# Patient Record
Sex: FEMALE | Race: WHITE | NOT HISPANIC OR LATINO | Employment: FULL TIME | URBAN - METROPOLITAN AREA
[De-identification: names, ages, dates, MRNs, and addresses within clinical notes are randomized per-mention and may not be internally consistent; named-entity substitution may affect disease eponyms.]

---

## 2018-06-30 ENCOUNTER — HOSPITAL ENCOUNTER (INPATIENT)
Facility: HOSPITAL | Age: 43
LOS: 4 days | Discharge: HOME | End: 2018-07-04
Attending: EMERGENCY MEDICINE | Admitting: HOSPITALIST

## 2018-06-30 ENCOUNTER — APPOINTMENT (OUTPATIENT)
Dept: CT IMAGING | Facility: HOSPITAL | Age: 43
End: 2018-06-30

## 2018-06-30 DIAGNOSIS — K57.32 SIGMOID DIVERTICULITIS: Primary | ICD-10-CM

## 2018-06-30 DIAGNOSIS — R10.30 LOWER ABDOMINAL PAIN: ICD-10-CM

## 2018-06-30 DIAGNOSIS — K57.20 DIVERTICULITIS OF LARGE INTESTINE WITH PERFORATION WITHOUT BLEEDING: ICD-10-CM

## 2018-06-30 PROBLEM — K63.1 PERFORATED SIGMOID COLON (HCC): Status: RESOLVED | Noted: 2018-06-30 | Resolved: 2018-06-30

## 2018-06-30 PROBLEM — K63.1 PERFORATED SIGMOID COLON (HCC): Status: ACTIVE | Noted: 2018-06-30

## 2018-06-30 PROBLEM — D72.825 BANDEMIA: Status: ACTIVE | Noted: 2018-06-30

## 2018-06-30 LAB
ALBUMIN SERPL-MCNC: 4.55 G/DL (ref 3.2–4.8)
ALBUMIN/GLOB SERPL: 1.1 G/DL (ref 1.5–2.5)
ALP SERPL-CCNC: 106 U/L (ref 25–100)
ALT SERPL W P-5'-P-CCNC: 42 U/L (ref 7–40)
ANION GAP SERPL CALCULATED.3IONS-SCNC: 10 MMOL/L (ref 3–11)
AST SERPL-CCNC: 25 U/L (ref 0–33)
BACTERIA UR QL AUTO: NORMAL /HPF
BASOPHILS # BLD AUTO: 0.06 10*3/MM3 (ref 0–0.2)
BASOPHILS NFR BLD AUTO: 0.4 % (ref 0–1)
BILIRUB SERPL-MCNC: 0.7 MG/DL (ref 0.3–1.2)
BILIRUB UR QL STRIP: NEGATIVE
BUN BLD-MCNC: 9 MG/DL (ref 9–23)
BUN/CREAT SERPL: 11.4 (ref 7–25)
CALCIUM SPEC-SCNC: 9.6 MG/DL (ref 8.7–10.4)
CHLORIDE SERPL-SCNC: 103 MMOL/L (ref 99–109)
CLARITY UR: CLEAR
CO2 SERPL-SCNC: 25 MMOL/L (ref 20–31)
COLOR UR: YELLOW
CREAT BLD-MCNC: 0.79 MG/DL (ref 0.6–1.3)
DEPRECATED RDW RBC AUTO: 42.7 FL (ref 37–54)
EOSINOPHIL # BLD AUTO: 0.17 10*3/MM3 (ref 0–0.3)
EOSINOPHIL NFR BLD AUTO: 1.1 % (ref 0–3)
ERYTHROCYTE [DISTWIDTH] IN BLOOD BY AUTOMATED COUNT: 12.7 % (ref 11.3–14.5)
GFR SERPL CREATININE-BSD FRML MDRD: 107 ML/MIN/1.73
GLOBULIN UR ELPH-MCNC: 4.3 GM/DL
GLUCOSE BLD-MCNC: 110 MG/DL (ref 70–100)
GLUCOSE UR STRIP-MCNC: NEGATIVE MG/DL
HCT VFR BLD AUTO: 46.8 % (ref 38.9–50.9)
HGB BLD-MCNC: 15.9 G/DL (ref 13.1–17.5)
HGB UR QL STRIP.AUTO: ABNORMAL
HOLD SPECIMEN: NORMAL
HOLD SPECIMEN: NORMAL
HYALINE CASTS UR QL AUTO: NORMAL /LPF
IMM GRANULOCYTES # BLD: 0.04 10*3/MM3 (ref 0–0.03)
IMM GRANULOCYTES NFR BLD: 0.3 % (ref 0–0.6)
KETONES UR QL STRIP: ABNORMAL
LEUKOCYTE ESTERASE UR QL STRIP.AUTO: NEGATIVE
LIPASE SERPL-CCNC: 28 U/L (ref 6–51)
LYMPHOCYTES # BLD AUTO: 3.07 10*3/MM3 (ref 0.6–4.8)
LYMPHOCYTES NFR BLD AUTO: 19.7 % (ref 24–44)
MCH RBC QN AUTO: 31.2 PG (ref 27–31)
MCHC RBC AUTO-ENTMCNC: 34 G/DL (ref 32–36)
MCV RBC AUTO: 91.9 FL (ref 80–99)
MONOCYTES # BLD AUTO: 1.69 10*3/MM3 (ref 0–1)
MONOCYTES NFR BLD AUTO: 10.9 % (ref 0–12)
NEUTROPHILS # BLD AUTO: 10.58 10*3/MM3 (ref 1.5–8.3)
NEUTROPHILS NFR BLD AUTO: 67.9 % (ref 41–71)
NITRITE UR QL STRIP: NEGATIVE
PH UR STRIP.AUTO: 5.5 [PH] (ref 5–8)
PLATELET # BLD AUTO: 253 10*3/MM3 (ref 150–450)
PMV BLD AUTO: 12.6 FL (ref 6–12)
POTASSIUM BLD-SCNC: 3.8 MMOL/L (ref 3.5–5.5)
PROT SERPL-MCNC: 8.8 G/DL (ref 5.7–8.2)
PROT UR QL STRIP: NEGATIVE
RBC # BLD AUTO: 5.09 10*6/MM3 (ref 4.2–5.76)
RBC # UR: NORMAL /HPF
REF LAB TEST METHOD: NORMAL
SODIUM BLD-SCNC: 138 MMOL/L (ref 132–146)
SP GR UR STRIP: 1.02 (ref 1–1.03)
SQUAMOUS #/AREA URNS HPF: NORMAL /HPF
UROBILINOGEN UR QL STRIP: ABNORMAL
WBC NRBC COR # BLD: 15.57 10*3/MM3 (ref 3.5–10.8)
WBC UR QL AUTO: NORMAL /HPF
WHOLE BLOOD HOLD SPECIMEN: NORMAL
WHOLE BLOOD HOLD SPECIMEN: NORMAL

## 2018-06-30 PROCEDURE — 87040 BLOOD CULTURE FOR BACTERIA: CPT | Performed by: FAMILY MEDICINE

## 2018-06-30 PROCEDURE — 25010000002 HYDROMORPHONE PER 4 MG: Performed by: EMERGENCY MEDICINE

## 2018-06-30 PROCEDURE — 80053 COMPREHEN METABOLIC PANEL: CPT | Performed by: PHYSICIAN ASSISTANT

## 2018-06-30 PROCEDURE — 25010000002 PIPERACILLIN SOD-TAZOBACTAM PER 1 G: Performed by: PHYSICIAN ASSISTANT

## 2018-06-30 PROCEDURE — 25010000002 HYDROMORPHONE PER 4 MG: Performed by: FAMILY MEDICINE

## 2018-06-30 PROCEDURE — 81001 URINALYSIS AUTO W/SCOPE: CPT | Performed by: PHYSICIAN ASSISTANT

## 2018-06-30 PROCEDURE — 85025 COMPLETE CBC W/AUTO DIFF WBC: CPT | Performed by: PHYSICIAN ASSISTANT

## 2018-06-30 PROCEDURE — 25010000002 IOPAMIDOL 61 % SOLUTION: Performed by: EMERGENCY MEDICINE

## 2018-06-30 PROCEDURE — 25010000002 KETOROLAC TROMETHAMINE PER 15 MG: Performed by: PHYSICIAN ASSISTANT

## 2018-06-30 PROCEDURE — 25010000002 PIPERACILLIN SOD-TAZOBACTAM PER 1 G: Performed by: FAMILY MEDICINE

## 2018-06-30 PROCEDURE — 0 DIATRIZOATE MEGLUMINE & SODIUM PER 1 ML: Performed by: PHYSICIAN ASSISTANT

## 2018-06-30 PROCEDURE — 74177 CT ABD & PELVIS W/CONTRAST: CPT

## 2018-06-30 PROCEDURE — 25010000002 ENOXAPARIN PER 10 MG: Performed by: FAMILY MEDICINE

## 2018-06-30 PROCEDURE — 99223 1ST HOSP IP/OBS HIGH 75: CPT | Performed by: FAMILY MEDICINE

## 2018-06-30 PROCEDURE — 25010000002 ONDANSETRON PER 1 MG: Performed by: PHYSICIAN ASSISTANT

## 2018-06-30 PROCEDURE — 99284 EMERGENCY DEPT VISIT MOD MDM: CPT

## 2018-06-30 PROCEDURE — 83690 ASSAY OF LIPASE: CPT | Performed by: PHYSICIAN ASSISTANT

## 2018-06-30 RX ORDER — SODIUM CHLORIDE 9 MG/ML
100 INJECTION, SOLUTION INTRAVENOUS CONTINUOUS
Status: DISCONTINUED | OUTPATIENT
Start: 2018-06-30 | End: 2018-07-01

## 2018-06-30 RX ORDER — NALOXONE HCL 0.4 MG/ML
0.4 VIAL (ML) INJECTION
Status: DISCONTINUED | OUTPATIENT
Start: 2018-06-30 | End: 2018-07-04 | Stop reason: HOSPADM

## 2018-06-30 RX ORDER — SODIUM CHLORIDE 0.9 % (FLUSH) 0.9 %
10 SYRINGE (ML) INJECTION AS NEEDED
Status: DISCONTINUED | OUTPATIENT
Start: 2018-06-30 | End: 2018-07-04 | Stop reason: HOSPADM

## 2018-06-30 RX ORDER — NICOTINE 21 MG/24HR
1 PATCH, TRANSDERMAL 24 HOURS TRANSDERMAL DAILY
Status: DISCONTINUED | OUTPATIENT
Start: 2018-06-30 | End: 2018-07-04 | Stop reason: HOSPADM

## 2018-06-30 RX ORDER — ONDANSETRON 2 MG/ML
4 INJECTION INTRAMUSCULAR; INTRAVENOUS ONCE
Status: COMPLETED | OUTPATIENT
Start: 2018-06-30 | End: 2018-06-30

## 2018-06-30 RX ORDER — PANTOPRAZOLE SODIUM 40 MG/10ML
40 INJECTION, POWDER, LYOPHILIZED, FOR SOLUTION INTRAVENOUS ONCE
Status: COMPLETED | OUTPATIENT
Start: 2018-06-30 | End: 2018-06-30

## 2018-06-30 RX ORDER — KETOROLAC TROMETHAMINE 15 MG/ML
15 INJECTION, SOLUTION INTRAMUSCULAR; INTRAVENOUS ONCE
Status: COMPLETED | OUTPATIENT
Start: 2018-06-30 | End: 2018-06-30

## 2018-06-30 RX ORDER — FUROSEMIDE 10 MG/ML
40 INJECTION INTRAMUSCULAR; INTRAVENOUS ONCE
Status: DISCONTINUED | OUTPATIENT
Start: 2018-06-30 | End: 2018-06-30

## 2018-06-30 RX ORDER — LORAZEPAM 1 MG/1
1 TABLET ORAL NIGHTLY PRN
Status: DISPENSED | OUTPATIENT
Start: 2018-06-30 | End: 2018-07-03

## 2018-06-30 RX ORDER — HYDROCODONE BITARTRATE AND ACETAMINOPHEN 5; 325 MG/1; MG/1
1 TABLET ORAL EVERY 4 HOURS PRN
Status: DISCONTINUED | OUTPATIENT
Start: 2018-06-30 | End: 2018-07-04 | Stop reason: HOSPADM

## 2018-06-30 RX ORDER — SODIUM CHLORIDE 0.9 % (FLUSH) 0.9 %
1-10 SYRINGE (ML) INJECTION AS NEEDED
Status: DISCONTINUED | OUTPATIENT
Start: 2018-06-30 | End: 2018-07-04 | Stop reason: HOSPADM

## 2018-06-30 RX ORDER — ONDANSETRON 2 MG/ML
4 INJECTION INTRAMUSCULAR; INTRAVENOUS EVERY 6 HOURS PRN
Status: DISCONTINUED | OUTPATIENT
Start: 2018-06-30 | End: 2018-07-04 | Stop reason: HOSPADM

## 2018-06-30 RX ADMIN — HYDROMORPHONE HYDROCHLORIDE 0.5 MG: 1 INJECTION, SOLUTION INTRAMUSCULAR; INTRAVENOUS; SUBCUTANEOUS at 15:20

## 2018-06-30 RX ADMIN — ENOXAPARIN SODIUM 40 MG: 40 INJECTION SUBCUTANEOUS at 16:57

## 2018-06-30 RX ADMIN — IOPAMIDOL 95 ML: 612 INJECTION, SOLUTION INTRAVENOUS at 14:26

## 2018-06-30 RX ADMIN — ONDANSETRON 4 MG: 2 INJECTION INTRAMUSCULAR; INTRAVENOUS at 12:57

## 2018-06-30 RX ADMIN — TAZOBACTAM SODIUM AND PIPERACILLIN SODIUM 4.5 G: 500; 4 INJECTION, SOLUTION INTRAVENOUS at 15:21

## 2018-06-30 RX ADMIN — SODIUM CHLORIDE 1000 ML: 9 INJECTION, SOLUTION INTRAVENOUS at 12:57

## 2018-06-30 RX ADMIN — TAZOBACTAM SODIUM AND PIPERACILLIN SODIUM 3.38 G: 375; 3 INJECTION, SOLUTION INTRAVENOUS at 22:08

## 2018-06-30 RX ADMIN — DIATRIZOATE MEGLUMINE AND DIATRIZOATE SODIUM 15 ML: 660; 100 LIQUID ORAL; RECTAL at 12:56

## 2018-06-30 RX ADMIN — PANTOPRAZOLE SODIUM 40 MG: 40 INJECTION, POWDER, FOR SOLUTION INTRAVENOUS at 12:57

## 2018-06-30 RX ADMIN — SODIUM CHLORIDE 100 ML/HR: 9 INJECTION, SOLUTION INTRAVENOUS at 16:56

## 2018-06-30 RX ADMIN — HYDROMORPHONE HYDROCHLORIDE 0.5 MG: 1 INJECTION, SOLUTION INTRAMUSCULAR; INTRAVENOUS; SUBCUTANEOUS at 23:58

## 2018-06-30 RX ADMIN — KETOROLAC TROMETHAMINE 15 MG: 15 INJECTION, SOLUTION INTRAMUSCULAR; INTRAVENOUS at 12:57

## 2018-06-30 RX ADMIN — HYDROCODONE BITARTRATE AND ACETAMINOPHEN 1 TABLET: 5; 325 TABLET ORAL at 22:06

## 2018-06-30 RX ADMIN — HYDROMORPHONE HYDROCHLORIDE 0.5 MG: 1 INJECTION, SOLUTION INTRAMUSCULAR; INTRAVENOUS; SUBCUTANEOUS at 16:56

## 2018-06-30 RX ADMIN — HYDROMORPHONE HYDROCHLORIDE 0.5 MG: 1 INJECTION, SOLUTION INTRAMUSCULAR; INTRAVENOUS; SUBCUTANEOUS at 20:19

## 2018-07-01 LAB
ANION GAP SERPL CALCULATED.3IONS-SCNC: 8 MMOL/L (ref 3–11)
ARTICHOKE IGE QN: 105 MG/DL (ref 0–130)
BASOPHILS # BLD AUTO: 0.05 10*3/MM3 (ref 0–0.2)
BASOPHILS NFR BLD AUTO: 0.4 % (ref 0–1)
BUN BLD-MCNC: 7 MG/DL (ref 9–23)
BUN/CREAT SERPL: 9.5 (ref 7–25)
CALCIUM SPEC-SCNC: 8.3 MG/DL (ref 8.7–10.4)
CHLORIDE SERPL-SCNC: 105 MMOL/L (ref 99–109)
CHOLEST SERPL-MCNC: 133 MG/DL (ref 0–200)
CO2 SERPL-SCNC: 25 MMOL/L (ref 20–31)
CREAT BLD-MCNC: 0.74 MG/DL (ref 0.6–1.3)
DEPRECATED RDW RBC AUTO: 43.9 FL (ref 37–54)
EOSINOPHIL # BLD AUTO: 0.15 10*3/MM3 (ref 0–0.3)
EOSINOPHIL NFR BLD AUTO: 1.1 % (ref 0–3)
ERYTHROCYTE [DISTWIDTH] IN BLOOD BY AUTOMATED COUNT: 12.7 % (ref 11.3–14.5)
GFR SERPL CREATININE-BSD FRML MDRD: 115 ML/MIN/1.73
GLUCOSE BLD-MCNC: 88 MG/DL (ref 70–100)
HBA1C MFR BLD: 6.2 % (ref 4.8–5.6)
HCT VFR BLD AUTO: 42.2 % (ref 38.9–50.9)
HDLC SERPL-MCNC: 26 MG/DL (ref 40–60)
HGB BLD-MCNC: 13.9 G/DL (ref 13.1–17.5)
IMM GRANULOCYTES # BLD: 0.04 10*3/MM3 (ref 0–0.03)
IMM GRANULOCYTES NFR BLD: 0.3 % (ref 0–0.6)
LYMPHOCYTES # BLD AUTO: 2.54 10*3/MM3 (ref 0.6–4.8)
LYMPHOCYTES NFR BLD AUTO: 19.3 % (ref 24–44)
MCH RBC QN AUTO: 30.9 PG (ref 27–31)
MCHC RBC AUTO-ENTMCNC: 32.9 G/DL (ref 32–36)
MCV RBC AUTO: 93.8 FL (ref 80–99)
MONOCYTES # BLD AUTO: 1.28 10*3/MM3 (ref 0–1)
MONOCYTES NFR BLD AUTO: 9.7 % (ref 0–12)
NEUTROPHILS # BLD AUTO: 9.12 10*3/MM3 (ref 1.5–8.3)
NEUTROPHILS NFR BLD AUTO: 69.2 % (ref 41–71)
PLATELET # BLD AUTO: 191 10*3/MM3 (ref 150–450)
PMV BLD AUTO: 12.2 FL (ref 6–12)
POTASSIUM BLD-SCNC: 3.7 MMOL/L (ref 3.5–5.5)
RBC # BLD AUTO: 4.5 10*6/MM3 (ref 4.2–5.76)
SODIUM BLD-SCNC: 138 MMOL/L (ref 132–146)
TRIGL SERPL-MCNC: 90 MG/DL (ref 0–150)
WBC NRBC COR # BLD: 13.18 10*3/MM3 (ref 3.5–10.8)

## 2018-07-01 PROCEDURE — 85025 COMPLETE CBC W/AUTO DIFF WBC: CPT | Performed by: FAMILY MEDICINE

## 2018-07-01 PROCEDURE — 25010000002 HYDROMORPHONE PER 4 MG: Performed by: FAMILY MEDICINE

## 2018-07-01 PROCEDURE — 83036 HEMOGLOBIN GLYCOSYLATED A1C: CPT | Performed by: FAMILY MEDICINE

## 2018-07-01 PROCEDURE — 80048 BASIC METABOLIC PNL TOTAL CA: CPT | Performed by: FAMILY MEDICINE

## 2018-07-01 PROCEDURE — 25010000002 ENOXAPARIN PER 10 MG: Performed by: FAMILY MEDICINE

## 2018-07-01 PROCEDURE — 80061 LIPID PANEL: CPT | Performed by: FAMILY MEDICINE

## 2018-07-01 PROCEDURE — 99232 SBSQ HOSP IP/OBS MODERATE 35: CPT | Performed by: INTERNAL MEDICINE

## 2018-07-01 PROCEDURE — 25010000002 PIPERACILLIN SOD-TAZOBACTAM PER 1 G: Performed by: FAMILY MEDICINE

## 2018-07-01 RX ORDER — ACETAMINOPHEN 325 MG/1
650 TABLET ORAL EVERY 6 HOURS PRN
Status: DISCONTINUED | OUTPATIENT
Start: 2018-07-01 | End: 2018-07-04 | Stop reason: HOSPADM

## 2018-07-01 RX ORDER — ACETAMINOPHEN 160 MG/5ML
650 SOLUTION ORAL EVERY 6 HOURS PRN
Status: DISCONTINUED | OUTPATIENT
Start: 2018-07-01 | End: 2018-07-01

## 2018-07-01 RX ORDER — DEXTROSE, SODIUM CHLORIDE, AND POTASSIUM CHLORIDE 5; .45; .15 G/100ML; G/100ML; G/100ML
100 INJECTION INTRAVENOUS CONTINUOUS
Status: DISCONTINUED | OUTPATIENT
Start: 2018-07-01 | End: 2018-07-04 | Stop reason: HOSPADM

## 2018-07-01 RX ADMIN — HYDROMORPHONE HYDROCHLORIDE 0.5 MG: 1 INJECTION, SOLUTION INTRAMUSCULAR; INTRAVENOUS; SUBCUTANEOUS at 23:44

## 2018-07-01 RX ADMIN — HYDROMORPHONE HYDROCHLORIDE 0.5 MG: 1 INJECTION, SOLUTION INTRAMUSCULAR; INTRAVENOUS; SUBCUTANEOUS at 12:57

## 2018-07-01 RX ADMIN — LORAZEPAM 1 MG: 1 TABLET ORAL at 23:43

## 2018-07-01 RX ADMIN — TAZOBACTAM SODIUM AND PIPERACILLIN SODIUM 3.38 G: 375; 3 INJECTION, SOLUTION INTRAVENOUS at 21:20

## 2018-07-01 RX ADMIN — POTASSIUM CHLORIDE, DEXTROSE MONOHYDRATE AND SODIUM CHLORIDE 100 ML/HR: 150; 5; 450 INJECTION, SOLUTION INTRAVENOUS at 17:52

## 2018-07-01 RX ADMIN — HYDROCODONE BITARTRATE AND ACETAMINOPHEN 1 TABLET: 5; 325 TABLET ORAL at 13:58

## 2018-07-01 RX ADMIN — SODIUM CHLORIDE 100 ML/HR: 9 INJECTION, SOLUTION INTRAVENOUS at 05:17

## 2018-07-01 RX ADMIN — HYDROMORPHONE HYDROCHLORIDE 0.5 MG: 1 INJECTION, SOLUTION INTRAMUSCULAR; INTRAVENOUS; SUBCUTANEOUS at 05:15

## 2018-07-01 RX ADMIN — SODIUM CHLORIDE 100 ML/HR: 9 INJECTION, SOLUTION INTRAVENOUS at 16:08

## 2018-07-01 RX ADMIN — ACETAMINOPHEN 650 MG: 325 TABLET, FILM COATED ORAL at 21:32

## 2018-07-01 RX ADMIN — TAZOBACTAM SODIUM AND PIPERACILLIN SODIUM 3.38 G: 375; 3 INJECTION, SOLUTION INTRAVENOUS at 13:58

## 2018-07-01 RX ADMIN — TAZOBACTAM SODIUM AND PIPERACILLIN SODIUM 3.38 G: 375; 3 INJECTION, SOLUTION INTRAVENOUS at 06:09

## 2018-07-01 RX ADMIN — HYDROCODONE BITARTRATE AND ACETAMINOPHEN 1 TABLET: 5; 325 TABLET ORAL at 19:47

## 2018-07-01 RX ADMIN — HYDROMORPHONE HYDROCHLORIDE 0.5 MG: 1 INJECTION, SOLUTION INTRAMUSCULAR; INTRAVENOUS; SUBCUTANEOUS at 19:21

## 2018-07-01 RX ADMIN — HYDROMORPHONE HYDROCHLORIDE 0.5 MG: 1 INJECTION, SOLUTION INTRAMUSCULAR; INTRAVENOUS; SUBCUTANEOUS at 09:50

## 2018-07-01 RX ADMIN — HYDROCODONE BITARTRATE AND ACETAMINOPHEN 1 TABLET: 5; 325 TABLET ORAL at 02:10

## 2018-07-01 RX ADMIN — HYDROMORPHONE HYDROCHLORIDE 0.5 MG: 1 INJECTION, SOLUTION INTRAMUSCULAR; INTRAVENOUS; SUBCUTANEOUS at 17:11

## 2018-07-01 RX ADMIN — HYDROMORPHONE HYDROCHLORIDE 0.5 MG: 1 INJECTION, SOLUTION INTRAMUSCULAR; INTRAVENOUS; SUBCUTANEOUS at 21:35

## 2018-07-01 RX ADMIN — ENOXAPARIN SODIUM 40 MG: 40 INJECTION SUBCUTANEOUS at 17:11

## 2018-07-01 RX ADMIN — HYDROCODONE BITARTRATE AND ACETAMINOPHEN 1 TABLET: 5; 325 TABLET ORAL at 06:48

## 2018-07-01 NOTE — PLAN OF CARE
Problem: Patient Care Overview  Goal: Plan of Care Review  Outcome: Ongoing (interventions implemented as appropriate)   07/01/18 0210 07/01/18 0527   Plan of Care Review   Progress --  improving   Coping/Psychosocial   Plan of Care Reviewed With patient;spouse --      Goal: Individualization and Mutuality  Outcome: Ongoing (interventions implemented as appropriate)    Goal: Discharge Needs Assessment  Outcome: Ongoing (interventions implemented as appropriate)   06/30/18 1700   Disability   Equipment Currently Used at Home none   Discharge Needs Assessment   Patient/Family Anticipates Transition to home with family   Patient/Family Anticipated Services at Transition none   Transportation Concerns car, none   Transportation Anticipated car, drives self     Goal: Interprofessional Rounds/Family Conf  Outcome: Ongoing (interventions implemented as appropriate)      Problem: Pain, Acute (Adult)  Goal: Acceptable Pain Control/Comfort Level  Outcome: Ongoing (interventions implemented as appropriate)   06/30/18 1753   Pain, Acute (Adult)   Acceptable Pain Control/Comfort Level unable to achieve outcome

## 2018-07-01 NOTE — PROGRESS NOTES
[facility] Medicine Services  INPATIENT PROGRESS NOTE    Date of Admission: 6/30/2018  Length of Stay: 1  Primary Care Physician: No Known Provider    Subjective     Chief Complaint: abdominal pain    HPI:  Patient doing Okay, glad to be able to have some water  No bm, still in pain  Review Of Systems:   Patient denies headaches, fever, chills, shortness of breath, chest pain, cough, nausea or vomiting, diarrhea, rash, itching or bleeding    Objective      Vitals:   99.4 °F (37.4 °C) Temp  Min: 97.6 °F (36.4 °C)  Max: 99.9 °F (37.7 °C)    119/64 BP  Min: 109/77  Max: 132/88    90 Pulse  Min: 72  Max: 90    18 Resp  Min: 16  Max: 20    96 % SpO2  Min: 94 %  Max: 98 %    room air       No Data Recorded     Patient is alert and talkative in no distress at rest though certainly uncomfortable  Neck is without mass or JVD  Heart is Reg wo murmur  Lungs are clear wo wheeze or crackle  Abd is soft guards the lower part of his abdomen tender to palpation  MAEW  Skin is without rash  Neurologic exam is nonfocal   Mood is appropriate      Results Review:    I have reviewed the labs, radiology results and diagnostic studies.      Results from last 7 days  Lab Units 07/01/18  0709 06/30/18  1235   WBC 10*3/mm3 13.18* 15.57*   HEMOGLOBIN g/dL 13.9 15.9   HEMATOCRIT % 42.2 46.8   PLATELETS 10*3/mm3 191 253       Results from last 7 days  Lab Units 07/01/18  0709 06/30/18  1235   SODIUM mmol/L 138 138   POTASSIUM mmol/L 3.7 3.8   CHLORIDE mmol/L 105 103   CO2 mmol/L 25.0 25.0   BUN mg/dL 7* 9   CREATININE mg/dL 0.74 0.79   GLUCOSE mg/dL 88 110*   CALCIUM mg/dL 8.3* 9.6   ALT (SGPT) U/L  --  42*   AST (SGOT) U/L  --  25       Microbiology Results Abnormal     Procedure Component Value - Date/Time    Blood Culture - Blood, [[id number]]  (Normal) Collected:  06/30/18 8804    Lab Status:  Preliminary result Specimen:  Blood from Wrist, Right Updated:  07/01/18 0522     Blood Culture No growth at less than 24  hours    Blood Culture - Blood, [[id number]]  (Normal) Collected:  06/30/18 8811    Lab Status:  Preliminary result Specimen:  Blood from Wrist, Left Updated:  07/01/18 0545     Blood Culture No growth at less than 24 hours        Ct Abdomen Pelvis With Contrast    Result Date: 6/30/2018  1. Acute proximal sigmoid diverticulitis with adjacent extraluminal air and trace free fluid consistent with perforation and likely forming phlegmonous process however no mature abscess or drainable fluid collection is identified. 2. Right lateral abdominal wall hernia containing nondilated bowel and mesenteric fat. 3. Hepatic steatosis.  DICTATED:   6/30/2018 EDITED/ls :   6/30/2018   This report was finalized on 6/30/2018 3:58 PM by Dr. Danny Sharp.           I have reviewed the medications.    Assessment/Plan     Assessment/Problem List  Hospital Problem List     * (Principal)Perforation of sigmoid colon due to diverticulitis    Bandemia    Obesity    Tobacco dependence        Plan  43 yo with1 week of abdominal pain- admitted 6/30 from the ER with a perforated sigmoid diverticultus  Plan is to continued IV ABX, bowel rest-  If clinically improves would be discharged home- with outpatient colonoscopy and management by colorectal surgery    DVT prophylaxis: hep sq  Discharge Planning: I expect patient to be discharged to home when better  Electronically signed by Susan Moran MD, 07/01/18 12:55 PM .

## 2018-07-01 NOTE — CONSULTS
Patient Care Team:  No Known Provider as PCP - General    Chief complaint: Diverticulitis.    History of Present Illness: 42-year-old gentleman presents to the emergency room after 1 week of left lower quadrant abdominal pain.  Initially, patient had a mild discomfort and a sensation that he needed to go to the bathroom, but could not.  Over the course week this worsened to the point that he came to the emergency room.  Here his white count was 16,000 and CT scan revealed acute complicated diverticulitis with a phlegmon in the proximal sigmoid colon with adjacent free air and a trace amount of fluid.  Note is also made of a fairly large right lower abdominal hernia.    Patient has never had a colonoscopy.  He is a smoker.  3 years ago he had her freight appendicitis that was taken care of by Dr. Paul Gusman over at [facility].  He had a prolonged hospitalization and ultimately developed a hernia at his incision site about a year and a half ago.    I'm asked to see the patient to help in the management of diverticulitis.    Principal Problems:  Principal Problem:    Perforation of sigmoid colon due to diverticulitis  Active Problems:    Bandemia    Obesity    Tobacco dependence      Review of Systems   Pertinent items are noted in HPI    History  Past Medical History:   Diagnosis Date   • DJD (degenerative joint disease), lumbar    • Fatty liver    • Obesity    • Tobacco dependence    • Umbilical hernia      Past Surgical History:   Procedure Laterality Date   • APPENDECTOMY  2015     Family History   Problem Relation Age of Onset   • No Known Problems Mother    • No Known Problems Father      Social History   Substance Use Topics   • Smoking status: Current Every Day Smoker     Packs/day: 0.50     Years: 24.00     Types: Cigarettes   • Smokeless tobacco: Never Used   • Alcohol use No     No prescriptions prior to admission.     Allergies:  Patient has no known allergies.    Objective     Vital  "Signs  Blood pressure 125/73, pulse 90, temperature 99.5 °F (37.5 °C), temperature source Oral, resp. rate 18, height 172.7 cm (68\"), weight 102 kg (225 lb 3.2 oz), SpO2 98 %.    Physical Exam:      General Appearance:    Alert, cooperative, in no acute distress   Head:    Normocephalic, without obvious abnormality, atraumatic   Eyes:            Lids and lashes normal, conjunctivae and sclerae normal, no   icterus, no pallor, corneas clear, PERRLA   Ears:    Ears appear intact with no abnormalities noted   Throat:   No oral lesions, no thrush, oral mucosa moist   Neck:   No adenopathy, supple, trachea midline, no thyromegaly, no   carotid bruit, no JVD   Back:     No kyphosis present, no scoliosis present, no skin lesions,      erythema or scars, no tenderness to percussion or                   palpation,   range of motion normal   Lungs:     Clear to auscultation,respirations regular, even and                  unlabored    Heart:    Regular rhythm and normal rate, normal S1 and S2, no            murmur, no gallop, no rub, no click   Chest Wall:    No abnormalities observed   Abdomen:     Patient is acutely tender to palpation in the left lower quadrant.  Abdomen is otherwise nondistended.  No organomegaly noted.  There is a well-healed low transverse incision with an associated hernia.     Rectal:     Deferred   Extremities:   Moves all extremities well, no edema, no cyanosis, no             redness   Pulses:   Pulses palpable and equal bilaterally   Skin:   No bleeding, bruising or rash   Lymph nodes:   No palpable adenopathy   Neurologic:   Cranial nerves 2 - 12 grossly intact, sensation intact, DTR       present and equal bilaterally     Rectal exam: deferred, not clinically indicated.    Results Review:    I reviewed the patient's new clinical results.    Assessment and Plan  this is patient's first episode of acute uncomplicated [place] to colitis.  Patient is feeling 8 had better this morning and his white " count has dropped to 13,000.  He will need a 14 day course of IV antibiotics for a severe intra-abdominal infection.  I would recommend having infectious disease see the patient to organize home IV antibiotics.  I think he can be discharged once his abdominal pain has improved and his white count normalized.  I would expect this to be within the next day.    I will follow-up with the patient in 2 weeks in the office.  I will order a follow-up CT scan of the abdomen and pelvis to rule out development of an interval abscess.  Patient will need a colonoscopy to evaluate the colon.  Decision on whether he may need surgery or not will hinge upon how his clinical status develops over the next month.    Thanks for allowing me to participate in his care.           I discussed the patients findings and my [id number] with patient and family.     Eder Willett MD  07/01/18  9:54 AM    Time: More than 50% of time spent in counseling and coordination of care:  Total face-to-face/floor time 40 min.  Time spent in counseling acute complicated diverticulitis and its management. min. Counseling included the following topics: Acute complicated diverticulitis and its management.

## 2018-07-01 NOTE — PLAN OF CARE
Problem: Pain, Acute (Adult)  Goal: Acceptable Pain Control/Comfort Level  Outcome: Ongoing (interventions implemented as appropriate)   07/01/18 9185   Pain, Acute (Adult)   Acceptable Pain Control/Comfort Level unable to achieve outcome

## 2018-07-02 LAB
ANION GAP SERPL CALCULATED.3IONS-SCNC: 8 MMOL/L (ref 3–11)
BASOPHILS # BLD AUTO: 0.03 10*3/MM3 (ref 0–0.2)
BASOPHILS NFR BLD AUTO: 0.2 % (ref 0–1)
BUN BLD-MCNC: 5 MG/DL (ref 9–23)
BUN/CREAT SERPL: 6.6 (ref 7–25)
CALCIUM SPEC-SCNC: 8.8 MG/DL (ref 8.7–10.4)
CHLORIDE SERPL-SCNC: 100 MMOL/L (ref 99–109)
CO2 SERPL-SCNC: 26 MMOL/L (ref 20–31)
CREAT BLD-MCNC: 0.76 MG/DL (ref 0.6–1.3)
DEPRECATED RDW RBC AUTO: 42.8 FL (ref 37–54)
EOSINOPHIL # BLD AUTO: 0.08 10*3/MM3 (ref 0–0.3)
EOSINOPHIL NFR BLD AUTO: 0.5 % (ref 0–3)
ERYTHROCYTE [DISTWIDTH] IN BLOOD BY AUTOMATED COUNT: 12.7 % (ref 11.3–14.5)
GFR SERPL CREATININE-BSD FRML MDRD: 111 ML/MIN/1.73
GLUCOSE BLD-MCNC: 115 MG/DL (ref 70–100)
HCT VFR BLD AUTO: 41.3 % (ref 38.9–50.9)
HGB BLD-MCNC: 13.9 G/DL (ref 13.1–17.5)
IMM GRANULOCYTES # BLD: 0.04 10*3/MM3 (ref 0–0.03)
IMM GRANULOCYTES NFR BLD: 0.2 % (ref 0–0.6)
LYMPHOCYTES # BLD AUTO: 1.74 10*3/MM3 (ref 0.6–4.8)
LYMPHOCYTES NFR BLD AUTO: 10.8 % (ref 24–44)
MCH RBC QN AUTO: 31.1 PG (ref 27–31)
MCHC RBC AUTO-ENTMCNC: 33.7 G/DL (ref 32–36)
MCV RBC AUTO: 92.4 FL (ref 80–99)
MONOCYTES # BLD AUTO: 2.18 10*3/MM3 (ref 0–1)
MONOCYTES NFR BLD AUTO: 13.5 % (ref 0–12)
NEUTROPHILS # BLD AUTO: 12.1 10*3/MM3 (ref 1.5–8.3)
NEUTROPHILS NFR BLD AUTO: 75 % (ref 41–71)
PLATELET # BLD AUTO: 204 10*3/MM3 (ref 150–450)
PMV BLD AUTO: 12.8 FL (ref 6–12)
POTASSIUM BLD-SCNC: 3.4 MMOL/L (ref 3.5–5.5)
POTASSIUM BLD-SCNC: 4.2 MMOL/L (ref 3.5–5.5)
RBC # BLD AUTO: 4.47 10*6/MM3 (ref 4.2–5.76)
SODIUM BLD-SCNC: 134 MMOL/L (ref 132–146)
WBC NRBC COR # BLD: 16.13 10*3/MM3 (ref 3.5–10.8)

## 2018-07-02 PROCEDURE — 99232 SBSQ HOSP IP/OBS MODERATE 35: CPT | Performed by: INTERNAL MEDICINE

## 2018-07-02 PROCEDURE — 85025 COMPLETE CBC W/AUTO DIFF WBC: CPT | Performed by: INTERNAL MEDICINE

## 2018-07-02 PROCEDURE — 84132 ASSAY OF SERUM POTASSIUM: CPT | Performed by: INTERNAL MEDICINE

## 2018-07-02 PROCEDURE — 25010000002 ENOXAPARIN PER 10 MG: Performed by: FAMILY MEDICINE

## 2018-07-02 PROCEDURE — 25010000002 PIPERACILLIN SOD-TAZOBACTAM PER 1 G: Performed by: FAMILY MEDICINE

## 2018-07-02 PROCEDURE — 80048 BASIC METABOLIC PNL TOTAL CA: CPT | Performed by: INTERNAL MEDICINE

## 2018-07-02 PROCEDURE — 25010000002 PIPERACILLIN SOD-TAZOBACTAM PER 1 G: Performed by: INTERNAL MEDICINE

## 2018-07-02 PROCEDURE — 25010000002 HYDROMORPHONE PER 4 MG: Performed by: FAMILY MEDICINE

## 2018-07-02 PROCEDURE — 25010000002 PIPERACILLIN SOD-TAZOBACTAM PER 1 G: Performed by: NURSE PRACTITIONER

## 2018-07-02 RX ORDER — POTASSIUM CHLORIDE 1.5 G/1.77G
40 POWDER, FOR SOLUTION ORAL AS NEEDED
Status: DISCONTINUED | OUTPATIENT
Start: 2018-07-02 | End: 2018-07-04 | Stop reason: HOSPADM

## 2018-07-02 RX ORDER — POTASSIUM CHLORIDE 750 MG/1
40 CAPSULE, EXTENDED RELEASE ORAL AS NEEDED
Status: DISCONTINUED | OUTPATIENT
Start: 2018-07-02 | End: 2018-07-04 | Stop reason: HOSPADM

## 2018-07-02 RX ORDER — POTASSIUM CHLORIDE 7.45 MG/ML
10 INJECTION INTRAVENOUS
Status: DISCONTINUED | OUTPATIENT
Start: 2018-07-02 | End: 2018-07-04 | Stop reason: HOSPADM

## 2018-07-02 RX ADMIN — HYDROMORPHONE HYDROCHLORIDE 0.5 MG: 1 INJECTION, SOLUTION INTRAMUSCULAR; INTRAVENOUS; SUBCUTANEOUS at 23:13

## 2018-07-02 RX ADMIN — POTASSIUM CHLORIDE 40 MEQ: 750 CAPSULE, EXTENDED RELEASE ORAL at 10:24

## 2018-07-02 RX ADMIN — TAZOBACTAM SODIUM AND PIPERACILLIN SODIUM 3.38 G: 375; 3 INJECTION, SOLUTION INTRAVENOUS at 21:37

## 2018-07-02 RX ADMIN — HYDROCODONE BITARTRATE AND ACETAMINOPHEN 1 TABLET: 5; 325 TABLET ORAL at 06:31

## 2018-07-02 RX ADMIN — TAZOBACTAM SODIUM AND PIPERACILLIN SODIUM 3.38 G: 375; 3 INJECTION, SOLUTION INTRAVENOUS at 06:26

## 2018-07-02 RX ADMIN — POTASSIUM CHLORIDE, DEXTROSE MONOHYDRATE AND SODIUM CHLORIDE 100 ML/HR: 150; 5; 450 INJECTION, SOLUTION INTRAVENOUS at 14:26

## 2018-07-02 RX ADMIN — HYDROMORPHONE HYDROCHLORIDE 0.5 MG: 1 INJECTION, SOLUTION INTRAMUSCULAR; INTRAVENOUS; SUBCUTANEOUS at 18:26

## 2018-07-02 RX ADMIN — TAZOBACTAM SODIUM AND PIPERACILLIN SODIUM 3.38 G: 375; 3 INJECTION, SOLUTION INTRAVENOUS at 14:26

## 2018-07-02 RX ADMIN — LORAZEPAM 1 MG: 1 TABLET ORAL at 19:49

## 2018-07-02 RX ADMIN — POTASSIUM CHLORIDE, DEXTROSE MONOHYDRATE AND SODIUM CHLORIDE 100 ML/HR: 150; 5; 450 INJECTION, SOLUTION INTRAVENOUS at 04:11

## 2018-07-02 RX ADMIN — HYDROCODONE BITARTRATE AND ACETAMINOPHEN 1 TABLET: 5; 325 TABLET ORAL at 10:30

## 2018-07-02 RX ADMIN — ACETAMINOPHEN 650 MG: 325 TABLET, FILM COATED ORAL at 17:53

## 2018-07-02 RX ADMIN — HYDROMORPHONE HYDROCHLORIDE 0.5 MG: 1 INJECTION, SOLUTION INTRAMUSCULAR; INTRAVENOUS; SUBCUTANEOUS at 04:11

## 2018-07-02 RX ADMIN — HYDROCODONE BITARTRATE AND ACETAMINOPHEN 1 TABLET: 5; 325 TABLET ORAL at 19:49

## 2018-07-02 RX ADMIN — HYDROMORPHONE HYDROCHLORIDE 0.5 MG: 1 INJECTION, SOLUTION INTRAMUSCULAR; INTRAVENOUS; SUBCUTANEOUS at 08:03

## 2018-07-02 RX ADMIN — HYDROMORPHONE HYDROCHLORIDE 0.5 MG: 1 INJECTION, SOLUTION INTRAMUSCULAR; INTRAVENOUS; SUBCUTANEOUS at 13:35

## 2018-07-02 RX ADMIN — ENOXAPARIN SODIUM 40 MG: 40 INJECTION SUBCUTANEOUS at 17:53

## 2018-07-02 RX ADMIN — POTASSIUM CHLORIDE 40 MEQ: 750 CAPSULE, EXTENDED RELEASE ORAL at 06:26

## 2018-07-02 NOTE — PLAN OF CARE
Problem: Patient Care Overview  Goal: Plan of Care Review  Outcome: Ongoing (interventions implemented as appropriate)   07/02/18 5521   Plan of Care Review   Progress no change   Coping/Psychosocial   Plan of Care Reviewed With patient   OTHER   Outcome Summary VSS. Pain controlled with medication. Low grade temp through the night, pt states passing gas. Will continue to monitor.

## 2018-07-02 NOTE — PROGRESS NOTES
Discharge Planning Assessment  [facility]     Patient Name: Geraldine Mehta  MRN: [id number]  Today's Date: 7/2/2018    Admit Date: 6/30/2018          Discharge Needs Assessment     Row Name 07/02/18 1531       Living Environment    Lives With spouse    Name(s) of Who Lives With Patient Srinivasa Alexander(spouse)    Current Living Arrangements home/apartment/condo    Primary Care Provided by self    Provides Primary Care For no one    Family Caregiver if Needed spouse    Family Caregiver Names Srinivasa Alexander(spouse)    Quality of Family Relationships supportive    Able to Return to Prior Arrangements yes    Living Arrangement Comments Spoke with pt in room with permission in regards to discharge planning. Pt resides in [place] with spouse.       Resource/Environmental Concerns    Resource/Environmental Concerns none    Transportation Concerns other (see comments)   Denies transportation concerns       Transition Planning    Patient/Family Anticipates Transition to home with family    Patient/Family Anticipated Services at Transition     Transportation Anticipated car, drives self;family or friend will provide       Discharge Needs Assessment    Readmission Within the Last 30 Days no previous admission in last 30 days    Concerns to be Addressed discharge planning    Equipment Currently Used at Home none    Anticipated Changes Related to Illness none    Equipment Needed After Discharge none    Discharge Coordination/Progress Pt has [employer] Insurance and denies recent changes in insurance. Pt his insurance denies prescription coverage. Plan is home with spouse at discharge. CM will cont to follow            Discharge Plan     Row Name 07/02/18 1531       Plan    Plan discharge plan    Patient/Family in Agreement with Plan yes    Plan Comments Plan is home with spouse when medically ready for discharge. [facility] following and pt may need outpatient IV antibiotics. CM will cont to follow        Destination     No  service coordination in this encounter.      Durable Medical Equipment     No service coordination in this encounter.      Dialysis/Infusion     No service coordination in this encounter.      Home Medical Care     No service coordination in this encounter.      Social Care     No service coordination in this encounter.        Expected Discharge Date and Time     Expected Discharge Date Expected Discharge Time    Jul 4, 2018               Demographic Summary     Row Name 07/02/18 1529       General Information    General Information Comments Pt's PCP is Johnny at [facility]       Contact Information    Permission Granted to Share Info With     Contact Information Obtained for     Contact Information Comments Ruchi Alexander(spouse):  [phone] or [phone]            Functional Status     Row Name 07/02/18 1530       Functional Status    Functional Status Comments Pt is independent of ADLs prior to admission.             Psychosocial    No documentation.           Abuse/Neglect    No documentation.           Legal    No documentation.           Substance Abuse    No documentation.           Patient Forms    No documentation.         Kamila Mancera, RN

## 2018-07-02 NOTE — CONSULTS
INFECTIOUS DISEASE CONSULT/INITIAL HOSPITAL VISIT    Geraldine Mehta  1975  [id number]    Date of Consult: 7/2/2018    Admission Date: 6/30/2018      Requesting Provider: No ref. provider found  Evaluating Physician: Jacob Schultz MD    Reason for Consultation: perforated diverticulitis     History of present illness:    Patient is a 42 y.o. male seen today for perforated diverticulitis. He presented to the ED with a 3 day history of lower abdominal pain, subjective fever, and loose stools.  An abdominal CT revealed acute sigmoid diverticulitis with extraluminal air and trace free fluid consistent with perforation, with forming phlegmonous process.  Admitting labs with a leukocytosis of 15, 000, maximum temperature of 101.4. Zosyn was initiated and we were consulted for antibiotic management.     Past Medical History:   Diagnosis Date   • DJD (degenerative joint disease), lumbar    • Fatty liver    • Obesity    • Tobacco dependence    • Umbilical hernia        Past Surgical History:   Procedure Laterality Date   • APPENDECTOMY  2015       Family History   Problem Relation Age of Onset   • No Known Problems Mother    • No Known Problems Father        Social History     Social History   • Marital status:      Spouse name: Srinivasa   • Number of children: 5   • Years of education: H.S.     Occupational History   •  [employer]     Social History Main Topics   • Smoking status: Current Every Day Smoker     Packs/day: 0.50     Years: 24.00     Types: Cigarettes   • Smokeless tobacco: Never Used   • Alcohol use No   • Drug use: No   • Sexual activity: Yes     Partners: Female     Other Topics Concern   • Not on file     Social History Narrative   • No narrative on file       No Known Allergies      Medication:    Current Facility-Administered Medications:   •  acetaminophen (TYLENOL) tablet 650 mg, 650 mg, Oral, Q6H PRN, Jessica Medrano, APRN, 650 mg at 07/02/18 1753  •  dextrose 5 % and  sodium chloride 0.45 % with KCl 20 mEq/L infusion, 100 mL/hr, Intravenous, Continuous, Susan Moran MD, Last Rate: 100 mL/hr at 18 1426, 100 mL/hr at 18 1426  •  enoxaparin (LOVENOX) syringe 40 mg, 40 mg, Subcutaneous, Q24H, Mukesh Barclay MD, 40 mg at 18 1753  •  HYDROcodone-acetaminophen (NORCO) 5-325 MG per tablet 1 tablet, 1 tablet, Oral, Q4H PRN, Mukesh Barclay MD, 1 tablet at 18 1030  •  HYDROmorphone (DILAUDID) injection 0.5 mg, 0.5 mg, Intravenous, Q2H PRN, 0.5 mg at 18 1826 **AND** naloxone (NARCAN) injection 0.4 mg, 0.4 mg, Intravenous, Q5 Min PRN, Mukesh Barclay MD  •  LORazepam (ATIVAN) tablet 1 mg, 1 mg, Oral, Nightly PRN, Mukesh Barclay MD, 1 mg at 18 2343  •  nicotine (NICODERM CQ) 21 MG/24HR patch 1 patch, 1 patch, Transdermal, Daily, Mukesh Barclay MD  •  ondansetron (ZOFRAN) injection 4 mg, 4 mg, Intravenous, Q6H PRN, Mukesh Barclay MD  •  piperacillin-tazobactam (ZOSYN) 3.375 g in iso-osmotic dextrose 50 ml (premix), 3.375 g, Intravenous, Q8H, Jacob Schultz MD, 3.375 g at 18 1426  •  potassium chloride (MICRO-K) CR capsule 40 mEq, 40 mEq, Oral, PRN, 40 mEq at 18 1024 **OR** potassium chloride (KLOR-CON) packet 40 mEq, 40 mEq, Oral, PRN **OR** potassium chloride 10 mEq in 100 mL IVPB, 10 mEq, Intravenous, Q1H PRN, NAIMA Cotto  •  sodium chloride 0.9 % flush 1-10 mL, 1-10 mL, Intravenous, PRN, Mukesh Barclay MD  •  Insert peripheral IV, , , Once **AND** sodium chloride 0.9 % flush 10 mL, 10 mL, Intravenous, PRN, LETTY Patino    Antibiotics:  Anti-Infectives     Ordered     Dose/Rate Route Frequency Start Stop    18 1642  piperacillin-tazobactam (ZOSYN) 3.375 g in iso-osmotic dextrose 50 ml (premix)     Jacob Schultz MD let the order  on 18 1858.   Ordering Provider:  Jacob Schultz MD    3.375 g  over 4 Hours Intravenous Every 8 Hours 18 2200 18 1857    18 1448  piperacillin-tazobactam (ZOSYN)  4.5 g in iso-osmotic dextrose 100 mL IVPB (premix)     Ordering Provider:  LETTY Patino    4.5 g Intravenous Once 18 1500 18 1617            Review of Systems:  Constitutional--subjective  Fever, chills and  sweats.  Appetite good, and no malaise. No fatigue.  HEENT-- No new vision, hearing or throat complaints.  No epistaxis or oral sores.  Denies odynophagia or dysphagia. No headache, photophobia or neck stiffness.  CV-- No chest pain, palpitation or syncope  Resp-- No SOB/cough/Hemoptysis  GI- + nausea,  Loose stools.   No hematochezia, melena, or hematemesis. Denies jaundice or chronic liver disease.  -- No dysuria, hematuria, or flank pain.  Denies hesitancy, urgency or flank pain.  Lymph- no swollen lymph nodes in neck/axilla or groin.   Heme- No active bruising or bleeding; no Hx of DVT or PE.  MS-- no swelling or pain in the bones or joints of arms/legs.  No new back pain.  Neuro-- No acute focal weakness or numbness in the arms or legs.  No seizures.  Skin--No rashes or lesions      Physical Exam:   Vital Signs  Temp (24hrs), Av.1 °F (37.8 °C), Min:98.9 °F (37.2 °C), Max:101.4 °F (38.6 °C)    Temp  Min: 98.9 °F (37.2 °C)  Max: 101.4 °F (38.6 °C)  BP  Min: 98/60  Max: 131/77  Pulse  Min: 89  Max: 96  Resp  Min: 16  Max: 20  No Data Recorded    GENERAL: Awake and alert, in no acute distress.   HEENT: Normocephalic, atraumatic.  PERRL. EOMI. No conjunctival injection. No icterus. Oropharynx clear without evidence of thrush or exudate. No evidence of peridontal disease.    NECK: Supple without nuchal rigidity. No mass.  LYMPH: No cervical, axillary or inguinal lymphadenopathy.  HEART: RRR; No murmur, rubs, gallops.   LUNGS: Clear to auscultation bilaterally without wheezing, rales, rhonchi. Normal respiratory effort. Nonlabored. No dullness.  ABDOMEN: soft, tender left lower quadrant.  EXT:  No cyanosis, clubbing or edema. No cord.  : Genitalia generally unremarkable.  Without Domingo  catheter.  MSK: FROM without joint effusions noted arms/legs.    SKIN: Warm and dry without cutaneous eruptions on Inspection/palpation.    NEURO: Oriented to PPT. No focal deficits on motor/sensory exam at arms/legs.  PSYCHIATRIC: Normal insight and judgement. Cooperative with PE    Laboratory Data      Results from last 7 days  Lab Units 07/02/18  0405 07/01/18  0709 06/30/18  1235   WBC 10*3/mm3 16.13* 13.18* 15.57*   HEMOGLOBIN g/dL 13.9 13.9 15.9   HEMATOCRIT % 41.3 42.2 46.8   PLATELETS 10*3/mm3 204 191 253       Results from last 7 days  Lab Units 07/02/18  1452 07/02/18  0405   SODIUM mmol/L  --  134   POTASSIUM mmol/L 4.2 3.4*   CHLORIDE mmol/L  --  100   CO2 mmol/L  --  26.0   BUN mg/dL  --  5*   CREATININE mg/dL  --  0.76   GLUCOSE mg/dL  --  115*   CALCIUM mg/dL  --  8.8       Results from last 7 days  Lab Units 06/30/18  1235   ALK PHOS U/L 106*   BILIRUBIN mg/dL 0.7   ALT (SGPT) U/L 42*   AST (SGOT) U/L 25                         Estimated Creatinine Clearance: 143.5 mL/min (by C-G formula based on SCr of 0.76 mg/dL).      Microbiology:  Blood Culture   Date Value Ref Range Status   06/30/2018 No growth at 24 hours  Preliminary   06/30/2018 No growth at 24 hours  Preliminary                                Radiology:  Imaging Results (last 72 hours)     Procedure Component Value Units Date/Time    CT Abdomen Pelvis With Contrast [[id number]] Collected:  06/30/18 1435     Updated:  06/30/18 1600    Narrative:       EXAMINATION: CT ABDOMEN PELVIS W/CONTRAST - 6/30/2018      INDICATION: Abdominal pain.     TECHNIQUE: CT abdomen pelvis with intravenous contrast administration.     The radiation dose reduction device was turned on for each scan per the  ALARA (As Low as Reasonably Achievable) protocol.     COMPARISON: None.     FINDINGS: Lung bases are grossly clear. Liver demonstrates moderately  diffuse low attenuation with focal fatty infiltration superimposed  around the falciform ligament consistent with  hepatic steatosis. No  focal liver lesion otherwise identified. Gallbladder unremarkable.  Pancreas and spleen within normal limits. Adrenals without distinct  nodule. Kidneys without hydronephrosis or hydroureter. No obstructive  uropathy or urolithiasis is evident. No bulky retroperitoneal  adenopathy. GI tract evaluation demonstrates fat-containing right  lateral abdominal wall hernia without dilated bowel or associated focal  fluid collection. Additionally noted is thickened and inflamed proximal  sigmoid colon within a region of diverticular disease consistent with  acute diverticulitis. Pericolonic inflammation as well as extraluminal  gas and minimal trace fluid adjacent consistent with perforation however  no mature abscess formation or focal fluid collection identified. No  drainable loculated fluid collection is identified. Pelvic viscera  grossly unremarkable otherwise. Degenerative changes of the spine  without aggressive osseous or soft tissue body wall lesions.       Impression:       1. Acute proximal sigmoid diverticulitis with adjacent extraluminal air  and trace free fluid consistent with perforation and likely forming  phlegmonous process however no mature abscess or drainable fluid  collection is identified.  2. Right lateral abdominal wall hernia containing nondilated bowel and  mesenteric fat.  3. Hepatic steatosis.     DICTATED:   6/30/2018  EDITED/ls :   6/30/2018         This report was finalized on 6/30/2018 3:58 PM by Dr. Danny Sharp.               Impression:   1. Sepsis- with leukocytosis, fever, known focus of infection  2.  Perforated sigmoid diverticulitis-I will plan to switch him from Zosyn to Invanz in the morning.  I will tentatively plan to give him approximately 2 weeks of intravenous antibiotic therapy via peripheral IV in our office daily.  He should be able to discharge when his requirement for intravenous narcotics has resolved and he is able to eat.  3.   Leukocytosis/neutrophilia, secondary to sepsis   4.  Hepatic steatosis    PLAN/RECOMMENDATIONS:   Thank you for asking us to see Geraldine Mehta, I recommend the followin.  Discontinue Zosyn the dose this evening  2.  Blood cultures ×2-pending  3.  Invanz 1 g IV daily starting in the morning  4.  Discharge soon for acute outpatient care in our office    Dr. Schultz has obtained the history, performed the physical exam and formulated the above treatment plan.      I discussed the disposition plan in detail with him and his wife today.    Jacob Schultz MD  2018  6:59 PM

## 2018-07-02 NOTE — PLAN OF CARE
Problem: Patient Care Overview  Goal: Plan of Care Review  Outcome: Ongoing (interventions implemented as appropriate)   07/02/18 9672   Plan of Care Review   Progress no change   Coping/Psychosocial   Plan of Care Reviewed With patient   OTHER   Outcome Summary VSS. Pt c/o pain, controlled with IV pain medication. Pt states PO medicine is not affective. No other concerns, will continue to monitor.       Problem: Pain, Acute (Adult)  Goal: Acceptable Pain Control/Comfort Level  Outcome: Ongoing (interventions implemented as appropriate)

## 2018-07-02 NOTE — PROGRESS NOTES
[facility] Medicine Services  INPATIENT PROGRESS NOTE    Date of Admission: 6/30/2018  Length of Stay: 2  Primary Care Physician: No Known Provider    Subjective     Chief Complaint: abdominal pain    HPI:  Patient doing Okay, has passed some gas  Review Of Systems:   Patient denies headaches, fever, chills, shortness of breath, chest pain, cough, nausea or vomiting, diarrhea, rash, itching or bleeding    Objective      Vitals:   99.3 °F (37.4 °C) Temp  Min: 98.6 °F (37 °C)  Max: 101.4 °F (38.6 °C)    98/60 BP  Min: 98/60  Max: 159/89    89 Pulse  Min: 89  Max: 96    18 Resp  Min: 16  Max: 20    96 % No Data Recorded    room air       No Data Recorded     Patient is alert and talkative in no distress at rest though certainly uncomfortable  Neck is without mass or JVD  Heart is Reg wo murmur  Lungs are clear wo wheeze or crackle  Abd is softer than yesterday his abdomen tender to palpation in LLQ  MAEW  Skin is without rash  Neurologic exam is nonfocal   Mood is appropriate      Results Review:    I have reviewed the labs, radiology results and diagnostic studies.      Results from last 7 days  Lab Units 07/02/18  0405 07/01/18  0709 06/30/18  1235   WBC 10*3/mm3 16.13* 13.18* 15.57*   HEMOGLOBIN g/dL 13.9 13.9 15.9   HEMATOCRIT % 41.3 42.2 46.8   PLATELETS 10*3/mm3 204 191 253       Results from last 7 days  Lab Units 07/02/18  0405 07/01/18  0709 06/30/18  1235   SODIUM mmol/L 134 138 138   POTASSIUM mmol/L 3.4* 3.7 3.8   CHLORIDE mmol/L 100 105 103   CO2 mmol/L 26.0 25.0 25.0   BUN mg/dL 5* 7* 9   CREATININE mg/dL 0.76 0.74 0.79   GLUCOSE mg/dL 115* 88 110*   CALCIUM mg/dL 8.8 8.3* 9.6   ALT (SGPT) U/L  --   --  42*   AST (SGOT) U/L  --   --  25       Microbiology Results Abnormal     Procedure Component Value - Date/Time    Blood Culture - Blood, [[id number]]  (Normal) Collected:  06/30/18 1330    Lab Status:  Preliminary result Specimen:  Blood from Wrist, Right Updated:  07/01/18 9317      Blood Culture No growth at 24 hours    Narrative:       Aerobic bottle only    Blood Culture - Blood, [[id number]]  (Normal) Collected:  06/30/18 1715    Lab Status:  Preliminary result Specimen:  Blood from Wrist, Left Updated:  07/01/18 1745     Blood Culture No growth at 24 hours        No radiology results for the last day       I have reviewed the medications.    Assessment/Plan     Assessment/Problem List  Hospital Problem List     * (Principal)Perforation of sigmoid colon due to diverticulitis    Bandemia    Obesity    Tobacco dependence        Plan  41 yo with1 week of abdominal pain- admitted 6/30 from the ER with a perforated sigmoid diverticultus  Plan is to continued IV ABX, bowel rest-  If clinically improves would be discharged home- with outpatient colonoscopy and management by colorectal surgery with ongoing IV ABX  replacing potassium today- recheck tomorrow  Hopefully WBC will start to trend down.    DVT prophylaxis: hep sq  Discharge Planning: I expect patient to be discharged to home when better  Electronically signed by Susan Moran MD, 07/02/18 3:19 PM .

## 2018-07-02 NOTE — PROGRESS NOTES
Pt. Continues to feel ok.  Still requiring a fair amount of pain medicine.  WBC up to 16K today.  Low grade temp.  Remains tender in the LLQ.  Plan:  Will need to stay on inpatient status until pain improved and WBC trending lower.  I continue to follow.    Eder Willett

## 2018-07-03 PROBLEM — A41.9 SEPSIS (HCC): Status: ACTIVE | Noted: 2018-07-03

## 2018-07-03 LAB
ANION GAP SERPL CALCULATED.3IONS-SCNC: 9 MMOL/L (ref 3–11)
BASOPHILS # BLD AUTO: 0.05 10*3/MM3 (ref 0–0.2)
BASOPHILS NFR BLD AUTO: 0.3 % (ref 0–1)
BUN BLD-MCNC: 5 MG/DL (ref 9–23)
BUN/CREAT SERPL: 6.8 (ref 7–25)
CALCIUM SPEC-SCNC: 8.4 MG/DL (ref 8.7–10.4)
CHLORIDE SERPL-SCNC: 102 MMOL/L (ref 99–109)
CO2 SERPL-SCNC: 26 MMOL/L (ref 20–31)
CREAT BLD-MCNC: 0.74 MG/DL (ref 0.6–1.3)
DEPRECATED RDW RBC AUTO: 43.8 FL (ref 37–54)
EOSINOPHIL # BLD AUTO: 0.19 10*3/MM3 (ref 0–0.3)
EOSINOPHIL NFR BLD AUTO: 1.1 % (ref 0–3)
ERYTHROCYTE [DISTWIDTH] IN BLOOD BY AUTOMATED COUNT: 12.9 % (ref 11.3–14.5)
GFR SERPL CREATININE-BSD FRML MDRD: 115 ML/MIN/1.73
GLUCOSE BLD-MCNC: 101 MG/DL (ref 70–100)
HCT VFR BLD AUTO: 41.2 % (ref 38.9–50.9)
HGB BLD-MCNC: 13.6 G/DL (ref 13.1–17.5)
IMM GRANULOCYTES # BLD: 0.06 10*3/MM3 (ref 0–0.03)
IMM GRANULOCYTES NFR BLD: 0.4 % (ref 0–0.6)
LYMPHOCYTES # BLD AUTO: 2.7 10*3/MM3 (ref 0.6–4.8)
LYMPHOCYTES NFR BLD AUTO: 16.3 % (ref 24–44)
MAGNESIUM SERPL-MCNC: 2.2 MG/DL (ref 1.3–2.7)
MCH RBC QN AUTO: 30.6 PG (ref 27–31)
MCHC RBC AUTO-ENTMCNC: 33 G/DL (ref 32–36)
MCV RBC AUTO: 92.8 FL (ref 80–99)
MONOCYTES # BLD AUTO: 1.84 10*3/MM3 (ref 0–1)
MONOCYTES NFR BLD AUTO: 11.1 % (ref 0–12)
NEUTROPHILS # BLD AUTO: 11.82 10*3/MM3 (ref 1.5–8.3)
NEUTROPHILS NFR BLD AUTO: 71.2 % (ref 41–71)
PLATELET # BLD AUTO: 194 10*3/MM3 (ref 150–450)
PMV BLD AUTO: 12.9 FL (ref 6–12)
POTASSIUM BLD-SCNC: 4 MMOL/L (ref 3.5–5.5)
RBC # BLD AUTO: 4.44 10*6/MM3 (ref 4.2–5.76)
SODIUM BLD-SCNC: 137 MMOL/L (ref 132–146)
WBC NRBC COR # BLD: 16.6 10*3/MM3 (ref 3.5–10.8)

## 2018-07-03 PROCEDURE — 25010000002 ENOXAPARIN PER 10 MG: Performed by: FAMILY MEDICINE

## 2018-07-03 PROCEDURE — 85025 COMPLETE CBC W/AUTO DIFF WBC: CPT | Performed by: INTERNAL MEDICINE

## 2018-07-03 PROCEDURE — 83735 ASSAY OF MAGNESIUM: CPT | Performed by: INTERNAL MEDICINE

## 2018-07-03 PROCEDURE — 25010000002 HYDROMORPHONE PER 4 MG: Performed by: FAMILY MEDICINE

## 2018-07-03 PROCEDURE — 99233 SBSQ HOSP IP/OBS HIGH 50: CPT | Performed by: HOSPITALIST

## 2018-07-03 PROCEDURE — 80048 BASIC METABOLIC PNL TOTAL CA: CPT | Performed by: INTERNAL MEDICINE

## 2018-07-03 PROCEDURE — 25010000002 ERTAPENEM 1 GM/100ML SOLUTION: Performed by: INTERNAL MEDICINE

## 2018-07-03 RX ORDER — LORAZEPAM 1 MG/1
1 TABLET ORAL NIGHTLY PRN
Status: DISCONTINUED | OUTPATIENT
Start: 2018-07-03 | End: 2018-07-04 | Stop reason: HOSPADM

## 2018-07-03 RX ADMIN — HYDROCODONE BITARTRATE AND ACETAMINOPHEN 1 TABLET: 5; 325 TABLET ORAL at 06:45

## 2018-07-03 RX ADMIN — HYDROMORPHONE HYDROCHLORIDE 0.5 MG: 1 INJECTION, SOLUTION INTRAMUSCULAR; INTRAVENOUS; SUBCUTANEOUS at 20:17

## 2018-07-03 RX ADMIN — ERTAPENEM SODIUM 1 G: 1 INJECTION, POWDER, LYOPHILIZED, FOR SOLUTION INTRAMUSCULAR; INTRAVENOUS at 09:08

## 2018-07-03 RX ADMIN — HYDROMORPHONE HYDROCHLORIDE 0.5 MG: 1 INJECTION, SOLUTION INTRAMUSCULAR; INTRAVENOUS; SUBCUTANEOUS at 23:50

## 2018-07-03 RX ADMIN — HYDROCODONE BITARTRATE AND ACETAMINOPHEN 1 TABLET: 5; 325 TABLET ORAL at 18:09

## 2018-07-03 RX ADMIN — POTASSIUM CHLORIDE, DEXTROSE MONOHYDRATE AND SODIUM CHLORIDE 100 ML/HR: 150; 5; 450 INJECTION, SOLUTION INTRAVENOUS at 00:29

## 2018-07-03 RX ADMIN — LORAZEPAM 1 MG: 1 TABLET ORAL at 23:50

## 2018-07-03 RX ADMIN — HYDROMORPHONE HYDROCHLORIDE 0.5 MG: 1 INJECTION, SOLUTION INTRAMUSCULAR; INTRAVENOUS; SUBCUTANEOUS at 11:53

## 2018-07-03 RX ADMIN — ENOXAPARIN SODIUM 40 MG: 40 INJECTION SUBCUTANEOUS at 18:06

## 2018-07-03 RX ADMIN — POTASSIUM CHLORIDE, DEXTROSE MONOHYDRATE AND SODIUM CHLORIDE 100 ML/HR: 150; 5; 450 INJECTION, SOLUTION INTRAVENOUS at 12:56

## 2018-07-03 RX ADMIN — HYDROCODONE BITARTRATE AND ACETAMINOPHEN 1 TABLET: 5; 325 TABLET ORAL at 22:41

## 2018-07-03 RX ADMIN — HYDROCODONE BITARTRATE AND ACETAMINOPHEN 1 TABLET: 5; 325 TABLET ORAL at 00:27

## 2018-07-03 RX ADMIN — HYDROMORPHONE HYDROCHLORIDE 0.5 MG: 1 INJECTION, SOLUTION INTRAMUSCULAR; INTRAVENOUS; SUBCUTANEOUS at 15:37

## 2018-07-03 NOTE — PROGRESS NOTES
[facility] Medicine Services  PROGRESS NOTE    Patient Name: Geraldine Mehta  : 1975  MRN: [id number]    Date of Admission: 2018  Length of Stay: 3  Primary Care Physician: No Known Provider    Subjective   Subjective     CC:  F/U abdominal pain    HPI:  Patient seen this morning. Pain is pretty much gone, passing some gas from below. No nausea. Tolerating liquid diet.     Fever 101.2 last evening.    Review of Systems  Gen-no fevers, no chills  CV-no chest pain, no palpitations  Resp-no cough, no dyspnea  GI-no N/V/D, improved abd pain    Otherwise ROS is negative except as mentioned in the HPI.    Objective   Objective     Vital Signs:   Temp:  [98.3 °F (36.8 °C)-101.2 °F (38.4 °C)] 98.3 °F (36.8 °C)  Heart Rate:  [84-93] 84  Resp:  [17-18] 18  BP: (111-114)/(74-78) 111/78        Physical Exam:  Gen-no acute distress  CV-RRR, S1 S2 normal, no m/r/g  Resp-CTAB, no wheezes  Abd-soft, very mild LLQ TTP, ND, +BS  Ext-no edema  Neuro-A&Ox3, no focal deficits  Psych-appropriate mood      Results Reviewed:  I have personally reviewed current lab, radiology, and data and agree.      Results from last 7 days  Lab Units 18  0336 18  0405 18  0709   WBC 10*3/mm3 16.60* 16.13* 13.18*   HEMOGLOBIN g/dL 13.6 13.9 13.9   HEMATOCRIT % 41.2 41.3 42.2   PLATELETS 10*3/mm3 194 204 191       Results from last 7 days  Lab Units 18  0336 18  1452 18  0405 18  0709 18  1235   SODIUM mmol/L 137  --  134 138 138   POTASSIUM mmol/L 4.0 4.2 3.4* 3.7 3.8   CHLORIDE mmol/L 102  --  100 105 103   CO2 mmol/L 26.0  --  26.0 25.0 25.0   BUN mg/dL 5*  --  5* 7* 9   CREATININE mg/dL 0.74  --  0.76 0.74 0.79   GLUCOSE mg/dL 101*  --  115* 88 110*   CALCIUM mg/dL 8.4*  --  8.8 8.3* 9.6   ALT (SGPT) U/L  --   --   --   --  42*   AST (SGOT) U/L  --   --   --   --  25     Estimated Creatinine Clearance: 147.4 mL/min (by C-G formula based on SCr of 0.74 mg/dL).  No  results found for: BNP    Microbiology Results Abnormal     Procedure Component Value - Date/Time    Blood Culture - Blood, [[id number]]  (Normal) Collected:  06/30/18 1715    Lab Status:  Preliminary result Specimen:  Blood from Wrist, Right Updated:  07/02/18 1745     Blood Culture No growth at 2 days    Narrative:       Aerobic bottle only    Blood Culture - Blood, [[id number]]  (Normal) Collected:  06/30/18 1715    Lab Status:  Preliminary result Specimen:  Blood from Wrist, Left Updated:  07/02/18 1745     Blood Culture No growth at 2 days          Imaging Results (last 24 hours)     ** No results found for the last 24 hours. **             I have reviewed the medications.    Assessment/Plan   Assessment / Plan     Hospital Problem List     * (Principal)Perforation of sigmoid colon due to diverticulitis    Bandemia    Obesity    Tobacco dependence    Sepsis (CMS/[facility])             Brief Hospital Course to date:  Geraldine Mehta is a 42 y.o. male presents with 1 week history of LLQ abdominal pain along with nausea, loose stools, and subjective fevers/chills. CT A/P in the ER revealed perforated sigmoid diverticulitis. Admitted to hospitalists.       Assessment & Plan:  --Initially on Zosyn, now switched to Invanz today per ID.  --Leukocytosis persists. Monitor.  --Still with fever last evening. Monitor.  --Dr. Willett following: in case things don't improve with current care, he may need surgery here.  --Encouraged him to ambulate as tolerated.    DVT Prophylaxis:  Lovenox    CODE STATUS:   Code Status and Medical Interventions:   Ordered at: 06/30/18 1550     Level Of Support Discussed With:    Patient     Code Status:    CPR     Medical Interventions (Level of Support Prior to Arrest):    Full       Disposition: I expect the patient to be discharged TBD    Electronically signed by Berenice Saenz MD, 07/03/18, 3:10 PM.

## 2018-07-03 NOTE — PROGRESS NOTES
Continued Stay Note  [facility]     Patient Name: Geraldine Mehta  MRN: [id number]  Today's Date: 7/3/2018    Admit Date: 6/30/2018          Discharge Plan     Row Name 07/03/18 1629       Plan    Plan discharge plan    Patient/Family in Agreement with Plan yes    Plan Comments Per provider note and pt, plan is to follow up at [facility] office for daily IV antibiotic therapy when medically ready for discharge. CM will cont to follow and assist with discharge needs.                 Discharge Codes    No documentation.       Expected Discharge Date and Time     Expected Discharge Date Expected Discharge Time    Jul 3, 2018             Kamila Mancera RN

## 2018-07-03 NOTE — PLAN OF CARE
Problem: Patient Care Overview  Goal: Plan of Care Review  Outcome: Ongoing (interventions implemented as appropriate)   07/03/18 0324 07/03/18 1600   Plan of Care Review   Progress improving --    Coping/Psychosocial   Plan of Care Reviewed With --  patient;spouse   OTHER   Outcome Summary VSS. Pt remained afebrile this shift. Still requiring po and IV pain medication for pain control. No other complaints or concerns, resting well. Will continue to monitor.  --      Goal: Discharge Needs Assessment  Outcome: Ongoing (interventions implemented as appropriate)   07/02/18 1531 07/03/18 1612   Discharge Needs Assessment   Readmission Within the Last 30 Days --  no previous admission in last 30 days   Concerns to be Addressed --  denies needs/concerns at this time   Patient/Family Anticipates Transition to --  home with family   Patient/Family Anticipated Services at Transition --     Transportation Concerns --  car, none   Transportation Anticipated car, drives self;family or friend will provide --    Anticipated Changes Related to Illness none --    Equipment Needed After Discharge none --    Discharge Coordination/Progress Pt has [employer] Insurance and denies recent changes in insurance. Pt his insurance denies prescription coverage. Plan is home with spouse at discharge. CM will cont to follow --    Disability   Equipment Currently Used at Home none --        Problem: Pain, Acute (Adult)  Goal: Acceptable Pain Control/Comfort Level  Outcome: Ongoing (interventions implemented as appropriate)   07/03/18 1612   Pain, Acute (Adult)   Acceptable Pain Control/Comfort Level making progress toward outcome

## 2018-07-03 NOTE — PROGRESS NOTES
[facility] Progress Note    Admission Date: 2018    Geraldine Mehta  1975  [id number]    Date: 7/3/2018    Meds:    Anti-Infectives     Ordered     Dose/Rate Route Frequency Start Stop    18 1901  ertapenem (INVanz) 1 g/100 mL 0.9% NS VTB (mbp)     Ordering Provider:  Jacob Schultz MD    1 g  over 30 Minutes Intravenous Every 24 Hours 18 0900 18 0859    18 1642  piperacillin-tazobactam (ZOSYN) 3.375 g in iso-osmotic dextrose 50 ml (premix)     Robin Schneider [facility] reviewed the order on 18 1902.   Ordering Provider:  Jacob Schultz MD    3.375 g  over 4 Hours Intravenous Every 8 Hours 18 2200 18 0137    18 1448  piperacillin-tazobactam (ZOSYN) 4.5 g in iso-osmotic dextrose 100 mL IVPB (premix)     Ordering Provider:  LETTY Patino    4.5 g Intravenous Once 18 1500 18 1617          CC: perforated diverticulitis       SUBJECTIVE: 18:  Patient is a 42 y.o. male seen today for perforated diverticulitis. He presented to the ED with a 3 day history of lower abdominal pain, subjective fever, and loose stools.  An abdominal CT revealed acute sigmoid diverticulitis with extraluminal air and trace free fluid consistent with perforation, with forming phlegmonous process.  Admitting labs with a leukocytosis of 15, 000, maximum temperature of 101.4. Zosyn was initiated and we were consulted for antibiotic management.  7/3/18:  He is feeling better this am.  Abdomen is less tender.  Tolerating Clear liquids.     ROS:  No f/c/s. No n/v/d. No rash. No new ADR to Abx.     PE:   Vital Signs  Temp (24hrs), Av.5 °F (36.9 °C), Min:98.3 °F (36.8 °C), Max:98.8 °F (37.1 °C)    Temp  Min: 98.3 °F (36.8 °C)  Max: 98.8 °F (37.1 °C)  BP  Min: 111/78  Max: 114/74  Pulse  Min: 84  Max: 93  Resp  Min: 17  Max: 18  No Data Recorded    GENERAL: Awake and alert, in no acute distress.   HEENT:  No conjunctival injection. No icterus. Oropharynx clear without evidence  of thrush or exudate.  NECK: Supple, no JVD     HEART: RRR; No murmur, rubs, gallops.   LUNGS: Clear to auscultation bilaterally without wheezing, rales, rhonchi. Normal respiratory effort. Nonlabored. No dullness.  ABDOMEN: Soft, decreased left lower quadrant tenderness,  nondistended. Positive bowel sounds. No rebound or guarding. NO mass or HSM.  EXT:  No cyanosis, clubbing or edema. No cord.  : Genitalia generally unremarkable.  Without Domingo catheter.  SKIN: Warm and dry without cutaneous eruptions on Inspection/palpation.    NEURO: Alert and oriented x 3, Motor 5/5 bilaterally    Laboratory Data      Results from last 7 days  Lab Units 07/03/18  0336 07/02/18  0405 07/01/18  0709   WBC 10*3/mm3 16.60* 16.13* 13.18*   HEMOGLOBIN g/dL 13.6 13.9 13.9   HEMATOCRIT % 41.2 41.3 42.2   PLATELETS 10*3/mm3 194 204 191       Results from last 7 days  Lab Units 07/03/18  0336   SODIUM mmol/L 137   POTASSIUM mmol/L 4.0   CHLORIDE mmol/L 102   CO2 mmol/L 26.0   BUN mg/dL 5*   CREATININE mg/dL 0.74   GLUCOSE mg/dL 101*   CALCIUM mg/dL 8.4*       Results from last 7 days  Lab Units 06/30/18  1235   ALK PHOS U/L 106*   BILIRUBIN mg/dL 0.7   ALT (SGPT) U/L 42*   AST (SGOT) U/L 25                         Estimated Creatinine Clearance: 147.4 mL/min (by C-G formula based on SCr of 0.74 mg/dL).    Microbiology:  Blood Culture   Date Value Ref Range Status   06/30/2018 No growth at 2 days  Preliminary   06/30/2018 No growth at 2 days  Preliminary                            Radiology:  Imaging Results (last 72 hours)     Procedure Component Value Units Date/Time    CT Abdomen Pelvis With Contrast [[id number]] Collected:  06/30/18 1435     Updated:  06/30/18 1600    Narrative:       EXAMINATION: CT ABDOMEN PELVIS W/CONTRAST - 6/30/2018      INDICATION: Abdominal pain.     TECHNIQUE: CT abdomen pelvis with intravenous contrast administration.     The radiation dose reduction device was turned on for each scan per the  ALARA (As Low  as Reasonably Achievable) protocol.     COMPARISON: None.     FINDINGS: Lung bases are grossly clear. Liver demonstrates moderately  diffuse low attenuation with focal fatty infiltration superimposed  around the falciform ligament consistent with hepatic steatosis. No  focal liver lesion otherwise identified. Gallbladder unremarkable.  Pancreas and spleen within normal limits. Adrenals without distinct  nodule. Kidneys without hydronephrosis or hydroureter. No obstructive  uropathy or urolithiasis is evident. No bulky retroperitoneal  adenopathy. GI tract evaluation demonstrates fat-containing right  lateral abdominal wall hernia without dilated bowel or associated focal  fluid collection. Additionally noted is thickened and inflamed proximal  sigmoid colon within a region of diverticular disease consistent with  acute diverticulitis. Pericolonic inflammation as well as extraluminal  gas and minimal trace fluid adjacent consistent with perforation however  no mature abscess formation or focal fluid collection identified. No  drainable loculated fluid collection is identified. Pelvic viscera  grossly unremarkable otherwise. Degenerative changes of the spine  without aggressive osseous or soft tissue body wall lesions.       Impression:       1. Acute proximal sigmoid diverticulitis with adjacent extraluminal air  and trace free fluid consistent with perforation and likely forming  phlegmonous process however no mature abscess or drainable fluid  collection is identified.  2. Right lateral abdominal wall hernia containing nondilated bowel and  mesenteric fat.  3. Hepatic steatosis.     DICTATED:   6/30/2018  EDITED/ls :   6/30/2018         This report was finalized on 6/30/2018 3:58 PM by Dr. Danny Sharp.             I personally read the radiographic studies     IMPRESSION:  1. Sepsis- with leukocytosis, fever, known focus of infection  2.  Perforated sigmoid diverticulitis  I will tentatively plan to give him  approximately 2 weeks of intravenous antibiotic therapy via peripheral IV in our office daily.    3.  Leukocytosis/neutrophilia, secondary to sepsis   4.  Hepatic steatosis       RECOMMENDATIONS;  1.  Invanz  1 Gram IV daily  2.  Monitor for ADRS.   3.  Try to advance diet in the morning to full liquids  4.  Do's use of parenteral narcotics-I discussed this with him and the nursing staff    Dr. Schultz has obtained the history, performed the physical exam and formulated the above treatment plan.     Jacob Schultz MD  7/3/2018  6:41 PM

## 2018-07-03 NOTE — PROGRESS NOTES
Temp curve is down.  WBC remains elevated.  Pain is improved per patient report, but nursing tells me that he is still taking a fair amt. Of pain meds.  Exam:  Remains tender in the LLQ.  This does seem somewhat improved.  Imp:  Complicated acute diverticulitis.  Plan:  Not yet ready for home.  I see that ID has changed abx.  I continue to follow.  If this does not correct soon, then pt. May need an operation while here.  I have spoken with him and his wife about that today.    Eder Willett

## 2018-07-04 VITALS
HEIGHT: 68 IN | DIASTOLIC BLOOD PRESSURE: 63 MMHG | TEMPERATURE: 97.6 F | HEART RATE: 79 BPM | BODY MASS INDEX: 34.13 KG/M2 | SYSTOLIC BLOOD PRESSURE: 106 MMHG | OXYGEN SATURATION: 96 % | RESPIRATION RATE: 17 BRPM | WEIGHT: 225.2 LBS

## 2018-07-04 PROBLEM — K43.9 HERNIA OF ABDOMINAL WALL: Status: ACTIVE | Noted: 2018-07-04

## 2018-07-04 LAB
ALBUMIN SERPL-MCNC: 3.65 G/DL (ref 3.2–4.8)
ALBUMIN/GLOB SERPL: 1.1 G/DL (ref 1.5–2.5)
ALP SERPL-CCNC: 89 U/L (ref 25–100)
ALT SERPL W P-5'-P-CCNC: 24 U/L (ref 7–40)
ANION GAP SERPL CALCULATED.3IONS-SCNC: 8 MMOL/L (ref 3–11)
AST SERPL-CCNC: 21 U/L (ref 0–33)
BASOPHILS # BLD AUTO: 0.04 10*3/MM3 (ref 0–0.2)
BASOPHILS NFR BLD AUTO: 0.4 % (ref 0–1)
BILIRUB SERPL-MCNC: 0.7 MG/DL (ref 0.3–1.2)
BUN BLD-MCNC: 6 MG/DL (ref 9–23)
BUN/CREAT SERPL: 9.8 (ref 7–25)
CALCIUM SPEC-SCNC: 8.7 MG/DL (ref 8.7–10.4)
CHLORIDE SERPL-SCNC: 103 MMOL/L (ref 99–109)
CO2 SERPL-SCNC: 26 MMOL/L (ref 20–31)
CREAT BLD-MCNC: 0.61 MG/DL (ref 0.6–1.3)
DEPRECATED RDW RBC AUTO: 44.1 FL (ref 37–54)
EOSINOPHIL # BLD AUTO: 0.2 10*3/MM3 (ref 0–0.3)
EOSINOPHIL NFR BLD AUTO: 2 % (ref 0–3)
ERYTHROCYTE [DISTWIDTH] IN BLOOD BY AUTOMATED COUNT: 12.8 % (ref 11.3–14.5)
GFR SERPL CREATININE-BSD FRML MDRD: 144 ML/MIN/1.73
GLOBULIN UR ELPH-MCNC: 3.4 GM/DL
GLUCOSE BLD-MCNC: 117 MG/DL (ref 70–100)
HCT VFR BLD AUTO: 43.4 % (ref 38.9–50.9)
HGB BLD-MCNC: 14.3 G/DL (ref 13.1–17.5)
IMM GRANULOCYTES # BLD: 0.02 10*3/MM3 (ref 0–0.03)
IMM GRANULOCYTES NFR BLD: 0.2 % (ref 0–0.6)
LYMPHOCYTES # BLD AUTO: 1.94 10*3/MM3 (ref 0.6–4.8)
LYMPHOCYTES NFR BLD AUTO: 19.6 % (ref 24–44)
MCH RBC QN AUTO: 30.8 PG (ref 27–31)
MCHC RBC AUTO-ENTMCNC: 32.9 G/DL (ref 32–36)
MCV RBC AUTO: 93.5 FL (ref 80–99)
MONOCYTES # BLD AUTO: 1.18 10*3/MM3 (ref 0–1)
MONOCYTES NFR BLD AUTO: 11.9 % (ref 0–12)
NEUTROPHILS # BLD AUTO: 6.52 10*3/MM3 (ref 1.5–8.3)
NEUTROPHILS NFR BLD AUTO: 65.9 % (ref 41–71)
PLATELET # BLD AUTO: 185 10*3/MM3 (ref 150–450)
PMV BLD AUTO: 12.8 FL (ref 6–12)
POTASSIUM BLD-SCNC: 4.1 MMOL/L (ref 3.5–5.5)
PROT SERPL-MCNC: 7 G/DL (ref 5.7–8.2)
RBC # BLD AUTO: 4.64 10*6/MM3 (ref 4.2–5.76)
SODIUM BLD-SCNC: 137 MMOL/L (ref 132–146)
WBC NRBC COR # BLD: 9.9 10*3/MM3 (ref 3.5–10.8)

## 2018-07-04 PROCEDURE — 80053 COMPREHEN METABOLIC PANEL: CPT | Performed by: HOSPITALIST

## 2018-07-04 PROCEDURE — 85025 COMPLETE CBC W/AUTO DIFF WBC: CPT | Performed by: HOSPITALIST

## 2018-07-04 PROCEDURE — 25010000002 ERTAPENEM 1 GM/100ML SOLUTION: Performed by: INTERNAL MEDICINE

## 2018-07-04 PROCEDURE — 99239 HOSP IP/OBS DSCHRG MGMT >30: CPT | Performed by: HOSPITALIST

## 2018-07-04 RX ORDER — HYDROCODONE BITARTRATE AND ACETAMINOPHEN 5; 325 MG/1; MG/1
1 TABLET ORAL EVERY 4 HOURS PRN
Qty: 18 TABLET | Refills: 0 | Status: SHIPPED | OUTPATIENT
Start: 2018-07-04 | End: 2018-07-07

## 2018-07-04 RX ORDER — ONDANSETRON 4 MG/1
4 TABLET, FILM COATED ORAL EVERY 8 HOURS PRN
Qty: 10 TABLET | Refills: 0 | Status: SHIPPED | OUTPATIENT
Start: 2018-07-04 | End: 2018-07-07

## 2018-07-04 RX ADMIN — NICOTINE 1 PATCH: 21 PATCH, EXTENDED RELEASE TRANSDERMAL at 08:47

## 2018-07-04 RX ADMIN — ERTAPENEM SODIUM 1 G: 1 INJECTION, POWDER, LYOPHILIZED, FOR SOLUTION INTRAMUSCULAR; INTRAVENOUS at 08:47

## 2018-07-04 RX ADMIN — HYDROCODONE BITARTRATE AND ACETAMINOPHEN 1 TABLET: 5; 325 TABLET ORAL at 08:46

## 2018-07-04 RX ADMIN — HYDROCODONE BITARTRATE AND ACETAMINOPHEN 1 TABLET: 5; 325 TABLET ORAL at 04:01

## 2018-07-04 NOTE — PROGRESS NOTES
Patient feeling better.  Abdominal pain decreased.  White count has corrected to 9.    Examination: Abdomen is soft with decreased tenderness in the left lower quadrant.  Patient continues to have a large hernia in the right lower quadrant.    Impression: Acute, dictated diverticulitis.  This seems to be resolving with antibiotic therapy.    Plan: Have spoken with Dr. Hernandez about home antibiotics for the next 14 days.  I will follow-up with the patient in the office in 2 weeks.    Eder Willett

## 2018-07-04 NOTE — PROGRESS NOTES
[facility] Progress Note    Admission Date: 2018    Geraldine Mehta  1975  [id number]    Date: 2018    Meds:    Anti-Infectives     Ordered     Dose/Rate Route Frequency Start Stop    18 1901  ertapenem (INVanz) 1 g/100 mL 0.9% NS VTB (mbp)     Ordering Provider:  Jaocb Schultz MD    1 g  over 30 Minutes Intravenous Every 24 Hours 18 0900 18 0859    18 1642  piperacillin-tazobactam (ZOSYN) 3.375 g in iso-osmotic dextrose 50 ml (premix)     [facility] reviewed the order on 18 1902.   Ordering Provider:  Jacob Schultz MD    3.375 g  over 4 Hours Intravenous Every 8 Hours 18 2200 18 0137    18 1448  piperacillin-tazobactam (ZOSYN) 4.5 g in iso-osmotic dextrose 100 mL IVPB (premix)     Ordering Provider:  LETTY Patino    4.5 g Intravenous Once 18 1500 18 1617          CC: perforated diverticulitis       SUBJECTIVE: 18:  Patient is a 42 y.o. male seen today for perforated diverticulitis. He presented to the ED with a 3 day history of lower abdominal pain, subjective fever, and loose stools.  An abdominal CT revealed acute sigmoid diverticulitis with extraluminal air and trace free fluid consistent with perforation, with forming phlegmonous process.  Admitting labs with a leukocytosis of 15, 000, maximum temperature of 101.4. Zosyn was initiated and we were consulted for antibiotic management.  7/3/18:  He is feeling better this am.  Abdomen is less tender.  Tolerating Clear liquids.   18: He feels a lot better today.  Had 2 bowel movements yesterday.  No nausea. He remains afebrile.  His abdominal pain is much improved.    ROS:  No f/c/s. No n/v/d. No rash. No new ADR to Abx.     PE:   Vital Signs  Temp (24hrs), Av.9 °F (36.6 °C), Min:97.6 °F (36.4 °C), Max:98.2 °F (36.8 °C)    Temp  Min: 97.6 °F (36.4 °C)  Max: 98.2 °F (36.8 °C)  BP  Min: 106/63  Max: 112/61  Pulse  Min: 79  Max: 96  Resp  Min: 17  Max: 18  No Data  Recorded    GENERAL: Awake and alert, in no acute distress.   HEENT:  No conjunctival injection. No icterus. Oropharynx clear without evidence of thrush or exudate.  NECK: Supple, no JVD     HEART: RRR; No murmur, rubs, gallops.   LUNGS: Clear to auscultation bilaterally without wheezing, rales, rhonchi. Normal respiratory effort. Nonlabored. No dullness.  ABDOMEN: Soft, mild left lower quadrant tenderness,  nondistended. Positive bowel sounds. No rebound or guarding. NO mass or HSM.  EXT:  No cyanosis, clubbing or edema. No cord.  : Genitalia generally unremarkable.  Without Domingo catheter.  SKIN: Warm and dry without cutaneous eruptions on Inspection/palpation.    NEURO: Alert and oriented x 3, Motor 5/5 bilaterally    Laboratory Data      Results from last 7 days  Lab Units 07/04/18  0541 07/03/18  0336 07/02/18  0405   WBC 10*3/mm3 9.90 16.60* 16.13*   HEMOGLOBIN g/dL 14.3 13.6 13.9   HEMATOCRIT % 43.4 41.2 41.3   PLATELETS 10*3/mm3 185 194 204       Results from last 7 days  Lab Units 07/03/18  0336   SODIUM mmol/L 137   POTASSIUM mmol/L 4.0   CHLORIDE mmol/L 102   CO2 mmol/L 26.0   BUN mg/dL 5*   CREATININE mg/dL 0.74   GLUCOSE mg/dL 101*   CALCIUM mg/dL 8.4*       Results from last 7 days  Lab Units 06/30/18  1235   ALK PHOS U/L 106*   BILIRUBIN mg/dL 0.7   ALT (SGPT) U/L 42*   AST (SGOT) U/L 25                         Estimated Creatinine Clearance: 147.4 mL/min (by C-G formula based on SCr of 0.74 mg/dL).    Microbiology:  Blood Culture   Date Value Ref Range Status   06/30/2018 No growth at 2 days  Preliminary   06/30/2018 No growth at 2 days  Preliminary                            Radiology:  Imaging Results (last 72 hours)     Procedure Component Value Units Date/Time    CT Abdomen Pelvis With Contrast [[id number]] Collected:  06/30/18 1435     Updated:  06/30/18 1600    Narrative:       EXAMINATION: CT ABDOMEN PELVIS W/CONTRAST - 6/30/2018      INDICATION: Abdominal pain.     TECHNIQUE: CT abdomen  pelvis with intravenous contrast administration.     The radiation dose reduction device was turned on for each scan per the  ALARA (As Low as Reasonably Achievable) protocol.     COMPARISON: None.     FINDINGS: Lung bases are grossly clear. Liver demonstrates moderately  diffuse low attenuation with focal fatty infiltration superimposed  around the falciform ligament consistent with hepatic steatosis. No  focal liver lesion otherwise identified. Gallbladder unremarkable.  Pancreas and spleen within normal limits. Adrenals without distinct  nodule. Kidneys without hydronephrosis or hydroureter. No obstructive  uropathy or urolithiasis is evident. No bulky retroperitoneal  adenopathy. GI tract evaluation demonstrates fat-containing right  lateral abdominal wall hernia without dilated bowel or associated focal  fluid collection. Additionally noted is thickened and inflamed proximal  sigmoid colon within a region of diverticular disease consistent with  acute diverticulitis. Pericolonic inflammation as well as extraluminal  gas and minimal trace fluid adjacent consistent with perforation however  no mature abscess formation or focal fluid collection identified. No  drainable loculated fluid collection is identified. Pelvic viscera  grossly unremarkable otherwise. Degenerative changes of the spine  without aggressive osseous or soft tissue body wall lesions.       Impression:       1. Acute proximal sigmoid diverticulitis with adjacent extraluminal air  and trace free fluid consistent with perforation and likely forming  phlegmonous process however no mature abscess or drainable fluid  collection is identified.  2. Right lateral abdominal wall hernia containing nondilated bowel and  mesenteric fat.  3. Hepatic steatosis.     DICTATED:   6/30/2018  EDITED/ls :   6/30/2018         This report was finalized on 6/30/2018 3:58 PM by Dr. Danny Sharp.             I personally read the radiographic studies     IMPRESSION:  1.  Sepsis- Improved  2.  Perforated sigmoid diverticulitis- he is clinically improved with decreased pain, a resolution of fever, resolution of leukocytosis, and an increased appetite.    I think we can discharge him today with acute outpatient care in our office  3.  Leukocytosis/neutrophilia, secondary to sepsis, improved   4.  Hepatic steatosis  5.  Abdominal wall hernia-at the time of surgical intervention for diverticulitis, he will likely require hernia repair with mesh.  This will necessitate more aggressive treatment of his diverticulitis in order to ensure that he has minimal or no residual infection at the time of mesh placement.       RECOMMENDATIONS;  1.  Invanz  1 Gram IV daily  2.  Discharge to home today  3.  Follow-up with me in the office tomorrow 7/5 at 8:30-this appointment has been made    UM/ROB: I have discussed his clinical situation with Dr. Willett and Dr. Saenz.  I think he is improved enough that we can discharge him today with close outpatient follow-up.  I discussed his situation with the nursing staff.  I discussed his disposition with him and his wife in detail today.  We will plan to infuse him with intravenous antibiotic therapy for a total of approximately 3 weeks prior to surgical intervention.    Dr. Schultz has obtained the history, performed the physical exam and formulated the above treatment plan.     Outpatient orders:  1.  Appointment to see me tomorrow Thursday 7/5 at 8:30  2.  Invanz 1 g IV daily to be given in our office via peripheral IV  3.  Kefir one half cup twice daily    Jacob Schultz MD  7/4/2018  9:34 AM

## 2018-07-04 NOTE — PLAN OF CARE
Problem: Patient Care Overview  Goal: Plan of Care Review  Outcome: Ongoing (interventions implemented as appropriate)   07/04/18 8389   Plan of Care Review   Progress improving   Coping/Psychosocial   Plan of Care Reviewed With patient   OTHER   Outcome Summary VSS. Pain decreasing, Pt tolerating Full liquids.Resting well. Anticipating d/c today or tomorrow.        Problem: Pain, Acute (Adult)  Goal: Acceptable Pain Control/Comfort Level  Outcome: Ongoing (interventions implemented as appropriate)

## 2018-07-04 NOTE — DISCHARGE SUMMARY
[facility] Medicine Services  DISCHARGE SUMMARY    Patient Name: Geraldine Mehta  : 1975  MRN: [id number]    Date of Admission: 2018  Date of Discharge:  18  Primary Care Physician: No Known Provider    Consults     Date and Time Order Name Status Description    2018 0030 Inpatient Infectious Diseases Consult Completed         Hospital Course     Presenting Problem:   Perforation of sigmoid colon due to diverticulitis [K57.20]    Active Hospital Problems    Diagnosis Date Noted   • **Perforation of sigmoid colon due to diverticulitis [K57.20] 2018   • Hernia of abdominal wall [K43.9] 2018   • Sepsis (CMS/HCC) [A41.9] 2018   • Bandemia [D72.825] 2018   • Obesity [E66.9]    • Tobacco dependence [F17.200]       Resolved Hospital Problems    Diagnosis Date Noted Date Resolved   • Perforated sigmoid colon (CMS/HCC) [K63.1] 2018          Hospital Course:  Geraldine Mehta is a 42 y.o. male who presents with 1 week history of LLQ abdominal pain along with nausea, loose stools, and subjective fevers/chills. CT A/P in the ER revealed perforated sigmoid diverticulitis. Admitted to hospitalists. He was placed on Zosyn initially but due to persistent leukocytosis and fever, he was switched to Invanz. His WBC has normalized today. Afebrile >36 hours.     Dr. Schultz plans for daily IV Invanz in the office via peripheral IV for total 3 weeks. He will see patient in clinic tomorrow 18 at 8:30 AM.    Dr. Willett will follow up with the patient in 2 weeks in the office. He will order a follow-up CT scan of the abdomen and pelvis to rule out development of an interval abscess.  Patient will need a colonoscopy to evaluate the colon.  Decision on whether he may need surgery or not will hinge upon how his clinical status develops over the next month.    Discharge home today with 3 day prescriptions for Norco and Zofran. He is tolerating a GI soft  diet.     Discharge Follow Up Recommendations for labs/diagnostics:  Follow up with Dr. Schultz 7/5/18 at 8:30 AM for IV Invanz  Follow up with Dr. Willett in 2 weeks with repeat CT abd/pelvis and future planning for colonoscopy        Day of Discharge     HPI:   Patient seen this morning. Tolerated eggs for breakfast. Has no complaints.     Review of Systems  Gen-no fevers, no chills  CV-no chest pain, no palpitations  Resp-no cough, no dyspnea  GI-no N/V/D, no abd pain      Otherwise ROS is negative except as mentioned in the HPI.    Vital Signs:   Temp:  [97.6 °F (36.4 °C)-98.2 °F (36.8 °C)] 97.6 °F (36.4 °C)  Heart Rate:  [79-96] 79  Resp:  [17-18] 17  BP: (106-112)/(61-63) 106/63     Physical Exam:  Gen-no acute distress  CV-RRR, S1 S2 normal, no m/r/g  Resp-CTAB, no wheezes  Abd-soft, NT, ND, +BS  Ext-no edema  Neuro-A&Ox3, no focal deficits  Psych-appropriate mood      Pertinent  and/or Most Recent Results       Results from last 7 days  Lab Units 07/04/18  0901 07/04/18  0541 07/03/18  0336 07/02/18  1452 07/02/18  0405 07/01/18  0709 06/30/18  1235   WBC 10*3/mm3  --  9.90 16.60*  --  16.13* 13.18* 15.57*   HEMOGLOBIN g/dL  --  14.3 13.6  --  13.9 13.9 15.9   HEMATOCRIT %  --  43.4 41.2  --  41.3 42.2 46.8   PLATELETS 10*3/mm3  --  185 194  --  204 191 253   SODIUM mmol/L 137  --  137  --  134 138 138   POTASSIUM mmol/L 4.1  --  4.0 4.2 3.4* 3.7 3.8   CHLORIDE mmol/L 103  --  102  --  100 105 103   CO2 mmol/L 26.0  --  26.0  --  26.0 25.0 25.0   BUN mg/dL 6*  --  5*  --  5* 7* 9   CREATININE mg/dL 0.61  --  0.74  --  0.76 0.74 0.79   GLUCOSE mg/dL 117*  --  101*  --  115* 88 110*   CALCIUM mg/dL 8.7  --  8.4*  --  8.8 8.3* 9.6       Results from last 7 days  Lab Units 07/04/18  0901 06/30/18  1235   BILIRUBIN mg/dL 0.7 0.7   ALK PHOS U/L 89 106*   ALT (SGPT) U/L 24 42*   AST (SGOT) U/L 21 25       Results from last 7 days  Lab Units 07/01/18  0709   CHOLESTEROL mg/dL 133   TRIGLYCERIDES mg/dL 90   HDL CHOL  mg/dL 26*       Results from last 7 days  Lab Units 07/01/18  0709   HEMOGLOBIN A1C % 6.20*     Brief Urine Lab Results  (Last result in the past 365 days)      Color   Clarity   Blood   Leuk Est   Nitrite   Protein   CREAT   Urine HCG        06/30/18 1402 Yellow Clear Trace(A) Negative Negative Negative               Microbiology Results Abnormal     Procedure Component Value - Date/Time    Blood Culture - Blood, [[id number]]  (Normal) Collected:  06/30/18 1715    Lab Status:  Preliminary result Specimen:  Blood from Wrist, Right Updated:  07/03/18 1745     Blood Culture No growth at 3 days    Narrative:       Aerobic bottle only    Blood Culture - Blood, [[id number]]  (Normal) Collected:  06/30/18 1715    Lab Status:  Preliminary result Specimen:  Blood from Wrist, Left Updated:  07/03/18 1745     Blood Culture No growth at 3 days          Imaging Results (all)     Procedure Component Value Units Date/Time    CT Abdomen Pelvis With Contrast [[id number]] Collected:  06/30/18 1435     Updated:  06/30/18 1600    Narrative:       EXAMINATION: CT ABDOMEN PELVIS W/CONTRAST - 6/30/2018      INDICATION: Abdominal pain.     TECHNIQUE: CT abdomen pelvis with intravenous contrast administration.     The radiation dose reduction device was turned on for each scan per the  ALARA (As Low as Reasonably Achievable) protocol.     COMPARISON: None.     FINDINGS: Lung bases are grossly clear. Liver demonstrates moderately  diffuse low attenuation with focal fatty infiltration superimposed  around the falciform ligament consistent with hepatic steatosis. No  focal liver lesion otherwise identified. Gallbladder unremarkable.  Pancreas and spleen within normal limits. Adrenals without distinct  nodule. Kidneys without hydronephrosis or hydroureter. No obstructive  uropathy or urolithiasis is evident. No bulky retroperitoneal  adenopathy. GI tract evaluation demonstrates fat-containing right  lateral abdominal wall hernia without dilated  bowel or associated focal  fluid collection. Additionally noted is thickened and inflamed proximal  sigmoid colon within a region of diverticular disease consistent with  acute diverticulitis. Pericolonic inflammation as well as extraluminal  gas and minimal trace fluid adjacent consistent with perforation however  no mature abscess formation or focal fluid collection identified. No  drainable loculated fluid collection is identified. Pelvic viscera  grossly unremarkable otherwise. Degenerative changes of the spine  without aggressive osseous or soft tissue body wall lesions.       Impression:       1. Acute proximal sigmoid diverticulitis with adjacent extraluminal air  and trace free fluid consistent with perforation and likely forming  phlegmonous process however no mature abscess or drainable fluid  collection is identified.  2. Right lateral abdominal wall hernia containing nondilated bowel and  mesenteric fat.  3. Hepatic steatosis.     DICTATED:   6/30/2018  EDITED/ls :   6/30/2018         This report was finalized on 6/30/2018 3:58 PM by Dr. Danny Sharp.                             Order Current Status    Blood Culture - Blood, Preliminary result    Blood Culture - Blood, Preliminary result        Discharge Details        Discharge Medications      New Medications      Instructions Start Date   ertapenem 1 gm/100ml solution IV  Commonly known as:  INVanz   1 g, Intravenous, Every 24 Hours   Start Date:  7/5/2018     HYDROcodone-acetaminophen 5-325 MG per tablet  Commonly known as:  NORCO   1 tablet, Oral, Every 4 Hours PRN      ondansetron 4 MG tablet  Commonly known as:  ZOFRAN   4 mg, Oral, Every 8 Hours PRN               Discharge Disposition:  Home or Self Care    Discharge Diet:  Diet Instructions     Advance Diet As Tolerated             Discharge Activity:   Activity Instructions     Activity as Tolerated               Code Status/Level of Support:  Code Status and Medical Interventions:   Ordered  at: 06/30/18 1550     Level Of Support Discussed With:    Patient     Code Status:    CPR     Medical Interventions (Level of Support Prior to Arrest):    Full       No future appointments.    Additional Instructions for the Follow-ups that You Need to Schedule     Discharge Follow-up with PCP    As directed      Currently Documented PCP:  No Known Provider  PCP Phone Number:  None    Follow Up Details:  1 week         Discharge Follow-up with Specialty: Dr. Willett; 2 Weeks    As directed      Specialty:  Dr. Willett    Follow Up:  2 Weeks         Discharge Follow-up with Specified Provider: Dr. Schultz tomorrow 7/5/18 at 8:30 AM    As directed      To:  Dr. Schultz tomorrow 7/5/18 at 8:30 AM               Time Spent on Discharge: 35 minutes    Electronically signed by Berenice Saenz MD, 07/04/18, 9:58 AM

## 2018-07-05 LAB
BACTERIA SPEC AEROBE CULT: NORMAL
BACTERIA SPEC AEROBE CULT: NORMAL

## 2018-07-09 ENCOUNTER — TRANSCRIBE ORDERS (OUTPATIENT)
Dept: LAB | Facility: HOSPITAL | Age: 43
End: 2018-07-09

## 2018-07-09 ENCOUNTER — LAB REQUISITION (OUTPATIENT)
Dept: LAB | Facility: HOSPITAL | Age: 43
End: 2018-07-09

## 2018-07-09 DIAGNOSIS — Z00.00 ROUTINE GENERAL MEDICAL EXAMINATION AT A HEALTH CARE FACILITY: ICD-10-CM

## 2018-07-09 LAB
ALBUMIN SERPL-MCNC: 3.87 G/DL (ref 3.2–4.8)
ALBUMIN/GLOB SERPL: 1.1 G/DL (ref 1.5–2.5)
ALP SERPL-CCNC: 86 U/L (ref 25–100)
ALT SERPL W P-5'-P-CCNC: 168 U/L (ref 7–40)
ANION GAP SERPL CALCULATED.3IONS-SCNC: 8 MMOL/L (ref 3–11)
AST SERPL-CCNC: 70 U/L (ref 0–33)
BASOPHILS # BLD AUTO: 0.04 10*3/MM3 (ref 0–0.2)
BASOPHILS NFR BLD AUTO: 0.5 % (ref 0–1)
BILIRUB SERPL-MCNC: 0.5 MG/DL (ref 0.3–1.2)
BUN BLD-MCNC: 15 MG/DL (ref 9–23)
BUN/CREAT SERPL: 17.2 (ref 7–25)
CALCIUM SPEC-SCNC: 8.9 MG/DL (ref 8.7–10.4)
CHLORIDE SERPL-SCNC: 105 MMOL/L (ref 99–109)
CO2 SERPL-SCNC: 25 MMOL/L (ref 20–31)
CREAT BLD-MCNC: 0.87 MG/DL (ref 0.6–1.3)
CRP SERPL-MCNC: 0.88 MG/DL (ref 0–1)
DEPRECATED RDW RBC AUTO: 44.6 FL (ref 37–54)
EOSINOPHIL # BLD AUTO: 0.19 10*3/MM3 (ref 0–0.3)
EOSINOPHIL NFR BLD AUTO: 2.6 % (ref 0–3)
ERYTHROCYTE [DISTWIDTH] IN BLOOD BY AUTOMATED COUNT: 13.1 % (ref 11.3–14.5)
ERYTHROCYTE [SEDIMENTATION RATE] IN BLOOD: 69 MM/HR (ref 0–15)
GFR SERPL CREATININE-BSD FRML MDRD: 95 ML/MIN/1.73
GLOBULIN UR ELPH-MCNC: 3.4 GM/DL
GLUCOSE BLD-MCNC: 170 MG/DL (ref 70–100)
HCT VFR BLD AUTO: 45.5 % (ref 38.9–50.9)
HGB BLD-MCNC: 15.1 G/DL (ref 13.1–17.5)
IMM GRANULOCYTES # BLD: 0.02 10*3/MM3 (ref 0–0.03)
IMM GRANULOCYTES NFR BLD: 0.3 % (ref 0–0.6)
LYMPHOCYTES # BLD AUTO: 2.77 10*3/MM3 (ref 0.6–4.8)
LYMPHOCYTES NFR BLD AUTO: 37.3 % (ref 24–44)
MCH RBC QN AUTO: 31 PG (ref 27–31)
MCHC RBC AUTO-ENTMCNC: 33.2 G/DL (ref 32–36)
MCV RBC AUTO: 93.4 FL (ref 80–99)
MONOCYTES # BLD AUTO: 0.36 10*3/MM3 (ref 0–1)
MONOCYTES NFR BLD AUTO: 4.9 % (ref 0–12)
NEUTROPHILS # BLD AUTO: 4.06 10*3/MM3 (ref 1.5–8.3)
NEUTROPHILS NFR BLD AUTO: 54.7 % (ref 41–71)
PLATELET # BLD AUTO: 274 10*3/MM3 (ref 150–450)
PMV BLD AUTO: 12.7 FL (ref 6–12)
POTASSIUM BLD-SCNC: 4.5 MMOL/L (ref 3.5–5.5)
PROT SERPL-MCNC: 7.3 G/DL (ref 5.7–8.2)
RBC # BLD AUTO: 4.87 10*6/MM3 (ref 4.2–5.76)
SODIUM BLD-SCNC: 138 MMOL/L (ref 132–146)
WBC NRBC COR # BLD: 7.42 10*3/MM3 (ref 3.5–10.8)

## 2018-07-09 PROCEDURE — 80053 COMPREHEN METABOLIC PANEL: CPT | Performed by: INTERNAL MEDICINE

## 2018-07-09 PROCEDURE — 85025 COMPLETE CBC W/AUTO DIFF WBC: CPT | Performed by: INTERNAL MEDICINE

## 2018-07-09 PROCEDURE — 86140 C-REACTIVE PROTEIN: CPT | Performed by: INTERNAL MEDICINE

## 2018-07-16 ENCOUNTER — LAB REQUISITION (OUTPATIENT)
Dept: LAB | Facility: HOSPITAL | Age: 43
End: 2018-07-16

## 2018-07-16 DIAGNOSIS — Z00.00 ROUTINE GENERAL MEDICAL EXAMINATION AT A HEALTH CARE FACILITY: ICD-10-CM

## 2018-07-16 LAB
ALBUMIN SERPL-MCNC: 4.01 G/DL (ref 3.2–4.8)
ALBUMIN/GLOB SERPL: 1.3 G/DL (ref 1.5–2.5)
ALP SERPL-CCNC: 97 U/L (ref 25–100)
ALT SERPL W P-5'-P-CCNC: 126 U/L (ref 7–40)
ANION GAP SERPL CALCULATED.3IONS-SCNC: 9 MMOL/L (ref 3–11)
AST SERPL-CCNC: 55 U/L (ref 0–33)
BASOPHILS # BLD AUTO: 0.07 10*3/MM3 (ref 0–0.2)
BASOPHILS NFR BLD AUTO: 1 % (ref 0–1)
BILIRUB SERPL-MCNC: 0.7 MG/DL (ref 0.3–1.2)
BUN BLD-MCNC: 13 MG/DL (ref 9–23)
BUN/CREAT SERPL: 17.1 (ref 7–25)
CALCIUM SPEC-SCNC: 9 MG/DL (ref 8.7–10.4)
CHLORIDE SERPL-SCNC: 105 MMOL/L (ref 99–109)
CO2 SERPL-SCNC: 24 MMOL/L (ref 20–31)
CREAT BLD-MCNC: 0.76 MG/DL (ref 0.6–1.3)
CRP SERPL-MCNC: 0.07 MG/DL (ref 0–1)
DEPRECATED RDW RBC AUTO: 46.7 FL (ref 37–54)
EOSINOPHIL # BLD AUTO: 0.23 10*3/MM3 (ref 0–0.3)
EOSINOPHIL NFR BLD AUTO: 3.3 % (ref 0–3)
ERYTHROCYTE [DISTWIDTH] IN BLOOD BY AUTOMATED COUNT: 13.7 % (ref 11.3–14.5)
ERYTHROCYTE [SEDIMENTATION RATE] IN BLOOD: 27 MM/HR (ref 0–15)
GFR SERPL CREATININE-BSD FRML MDRD: 111 ML/MIN/1.73
GLOBULIN UR ELPH-MCNC: 3.2 GM/DL
GLUCOSE BLD-MCNC: 131 MG/DL (ref 70–100)
HCT VFR BLD AUTO: 45.7 % (ref 38.9–50.9)
HGB BLD-MCNC: 15.2 G/DL (ref 13.1–17.5)
IMM GRANULOCYTES # BLD: 0.02 10*3/MM3 (ref 0–0.03)
IMM GRANULOCYTES NFR BLD: 0.3 % (ref 0–0.6)
LYMPHOCYTES # BLD AUTO: 3.1 10*3/MM3 (ref 0.6–4.8)
LYMPHOCYTES NFR BLD AUTO: 44 % (ref 24–44)
MCH RBC QN AUTO: 31.1 PG (ref 27–31)
MCHC RBC AUTO-ENTMCNC: 33.3 G/DL (ref 32–36)
MCV RBC AUTO: 93.6 FL (ref 80–99)
MONOCYTES # BLD AUTO: 0.4 10*3/MM3 (ref 0–1)
MONOCYTES NFR BLD AUTO: 5.7 % (ref 0–12)
NEUTROPHILS # BLD AUTO: 3.25 10*3/MM3 (ref 1.5–8.3)
NEUTROPHILS NFR BLD AUTO: 46 % (ref 41–71)
PLATELET # BLD AUTO: 224 10*3/MM3 (ref 150–450)
PMV BLD AUTO: 12.2 FL (ref 6–12)
POTASSIUM BLD-SCNC: 4.3 MMOL/L (ref 3.5–5.5)
PROT SERPL-MCNC: 7.2 G/DL (ref 5.7–8.2)
RBC # BLD AUTO: 4.88 10*6/MM3 (ref 4.2–5.76)
SODIUM BLD-SCNC: 138 MMOL/L (ref 132–146)
WBC NRBC COR # BLD: 7.05 10*3/MM3 (ref 3.5–10.8)

## 2018-07-16 PROCEDURE — 86140 C-REACTIVE PROTEIN: CPT | Performed by: INTERNAL MEDICINE

## 2018-07-16 PROCEDURE — 85025 COMPLETE CBC W/AUTO DIFF WBC: CPT | Performed by: INTERNAL MEDICINE

## 2018-07-16 PROCEDURE — 80053 COMPREHEN METABOLIC PANEL: CPT | Performed by: INTERNAL MEDICINE

## 2018-07-16 PROCEDURE — 36415 COLL VENOUS BLD VENIPUNCTURE: CPT | Performed by: INTERNAL MEDICINE

## 2018-07-24 ENCOUNTER — LAB REQUISITION (OUTPATIENT)
Dept: LAB | Facility: HOSPITAL | Age: 43
End: 2018-07-24

## 2018-07-24 DIAGNOSIS — Z00.00 ROUTINE GENERAL MEDICAL EXAMINATION AT A HEALTH CARE FACILITY: ICD-10-CM

## 2018-07-24 DIAGNOSIS — K76.0 FATTY (CHANGE OF) LIVER, NOT ELSEWHERE CLASSIFIED: ICD-10-CM

## 2018-07-24 DIAGNOSIS — K46.9 ABDOMINAL HERNIA WITHOUT OBSTRUCTION OR GANGRENE: ICD-10-CM

## 2018-07-24 DIAGNOSIS — K57.20 DIVERTICULITIS OF LARGE INTESTINE WITH PERFORATION AND ABSCESS WITHOUT BLEEDING: ICD-10-CM

## 2018-07-24 LAB
HAV IGM SERPL QL IA: NORMAL
HBV CORE IGM SERPL QL IA: NORMAL
HBV SURFACE AG SERPL QL IA: NORMAL
HCV AB SER DONR QL: NORMAL

## 2018-07-24 PROCEDURE — 80074 ACUTE HEPATITIS PANEL: CPT | Performed by: INTERNAL MEDICINE

## 2018-07-24 PROCEDURE — 36415 COLL VENOUS BLD VENIPUNCTURE: CPT | Performed by: INTERNAL MEDICINE

## 2019-01-23 ENCOUNTER — HOSPITAL ENCOUNTER (EMERGENCY)
Facility: HOSPITAL | Age: 44
Discharge: HOME | End: 2019-01-23
Attending: EMERGENCY MEDICINE | Admitting: EMERGENCY MEDICINE

## 2019-01-23 ENCOUNTER — APPOINTMENT (OUTPATIENT)
Dept: CT IMAGING | Facility: HOSPITAL | Age: 44
End: 2019-01-23

## 2019-01-23 VITALS
WEIGHT: 230 LBS | HEART RATE: 64 BPM | SYSTOLIC BLOOD PRESSURE: 112 MMHG | RESPIRATION RATE: 16 BRPM | BODY MASS INDEX: 32.93 KG/M2 | TEMPERATURE: 97.5 F | DIASTOLIC BLOOD PRESSURE: 70 MMHG | HEIGHT: 70 IN | OXYGEN SATURATION: 94 %

## 2019-01-23 DIAGNOSIS — K57.32 SIGMOID DIVERTICULITIS: Primary | ICD-10-CM

## 2019-01-23 LAB
ALBUMIN SERPL-MCNC: 4.76 G/DL (ref 3.2–4.8)
ALBUMIN/GLOB SERPL: 1.5 G/DL (ref 1.5–2.5)
ALP SERPL-CCNC: 108 U/L (ref 25–100)
ALT SERPL W P-5'-P-CCNC: 32 U/L (ref 7–40)
ANION GAP SERPL CALCULATED.3IONS-SCNC: 9 MMOL/L (ref 3–11)
AST SERPL-CCNC: 21 U/L (ref 0–33)
BASOPHILS # BLD AUTO: 0.05 10*3/MM3 (ref 0–0.2)
BASOPHILS NFR BLD AUTO: 0.4 % (ref 0–1)
BILIRUB SERPL-MCNC: 1.1 MG/DL (ref 0.3–1.2)
BILIRUB UR QL STRIP: NEGATIVE
BUN BLD-MCNC: 10 MG/DL (ref 9–23)
BUN/CREAT SERPL: 12.8 (ref 7–25)
CALCIUM SPEC-SCNC: 9.6 MG/DL (ref 8.7–10.4)
CHLORIDE SERPL-SCNC: 104 MMOL/L (ref 99–109)
CLARITY UR: CLEAR
CO2 SERPL-SCNC: 21 MMOL/L (ref 20–31)
COLOR UR: YELLOW
CREAT BLD-MCNC: 0.78 MG/DL (ref 0.6–1.3)
DEPRECATED RDW RBC AUTO: 45.6 FL (ref 37–54)
EOSINOPHIL # BLD AUTO: 0.21 10*3/MM3 (ref 0–0.3)
EOSINOPHIL NFR BLD AUTO: 1.8 % (ref 0–3)
ERYTHROCYTE [DISTWIDTH] IN BLOOD BY AUTOMATED COUNT: 13.3 % (ref 11.3–14.5)
GFR SERPL CREATININE-BSD FRML MDRD: 108 ML/MIN/1.73
GLOBULIN UR ELPH-MCNC: 3.2 GM/DL
GLUCOSE BLD-MCNC: 109 MG/DL (ref 70–100)
GLUCOSE UR STRIP-MCNC: NEGATIVE MG/DL
HCT VFR BLD AUTO: 49.5 % (ref 38.9–50.9)
HGB BLD-MCNC: 16.9 G/DL (ref 13.1–17.5)
HGB UR QL STRIP.AUTO: NEGATIVE
HOLD SPECIMEN: NORMAL
HOLD SPECIMEN: NORMAL
IMM GRANULOCYTES # BLD AUTO: 0.01 10*3/MM3 (ref 0–0.03)
IMM GRANULOCYTES NFR BLD AUTO: 0.1 % (ref 0–0.6)
KETONES UR QL STRIP: ABNORMAL
LEUKOCYTE ESTERASE UR QL STRIP.AUTO: NEGATIVE
LIPASE SERPL-CCNC: 35 U/L (ref 6–51)
LYMPHOCYTES # BLD AUTO: 3.22 10*3/MM3 (ref 0.6–4.8)
LYMPHOCYTES NFR BLD AUTO: 27 % (ref 24–44)
MCH RBC QN AUTO: 32 PG (ref 27–31)
MCHC RBC AUTO-ENTMCNC: 34.1 G/DL (ref 32–36)
MCV RBC AUTO: 93.8 FL (ref 80–99)
MONOCYTES # BLD AUTO: 1.22 10*3/MM3 (ref 0–1)
MONOCYTES NFR BLD AUTO: 10.2 % (ref 0–12)
NEUTROPHILS # BLD AUTO: 7.24 10*3/MM3 (ref 1.5–8.3)
NEUTROPHILS NFR BLD AUTO: 60.6 % (ref 41–71)
NITRITE UR QL STRIP: NEGATIVE
PH UR STRIP.AUTO: 6 [PH] (ref 5–8)
PLATELET # BLD AUTO: 178 10*3/MM3 (ref 150–450)
PMV BLD AUTO: 12.4 FL (ref 6–12)
POTASSIUM BLD-SCNC: 3.6 MMOL/L (ref 3.5–5.5)
PROT SERPL-MCNC: 8 G/DL (ref 5.7–8.2)
PROT UR QL STRIP: NEGATIVE
RBC # BLD AUTO: 5.28 10*6/MM3 (ref 4.2–5.76)
SODIUM BLD-SCNC: 134 MMOL/L (ref 132–146)
SP GR UR STRIP: 1.02 (ref 1–1.03)
UROBILINOGEN UR QL STRIP: ABNORMAL
WBC NRBC COR # BLD: 11.94 10*3/MM3 (ref 3.5–10.8)
WHOLE BLOOD HOLD SPECIMEN: NORMAL
WHOLE BLOOD HOLD SPECIMEN: NORMAL

## 2019-01-23 PROCEDURE — 85025 COMPLETE CBC W/AUTO DIFF WBC: CPT

## 2019-01-23 PROCEDURE — 99284 EMERGENCY DEPT VISIT MOD MDM: CPT

## 2019-01-23 PROCEDURE — 74176 CT ABD & PELVIS W/O CONTRAST: CPT

## 2019-01-23 PROCEDURE — 80053 COMPREHEN METABOLIC PANEL: CPT | Performed by: EMERGENCY MEDICINE

## 2019-01-23 PROCEDURE — 83690 ASSAY OF LIPASE: CPT | Performed by: EMERGENCY MEDICINE

## 2019-01-23 PROCEDURE — 36415 COLL VENOUS BLD VENIPUNCTURE: CPT

## 2019-01-23 PROCEDURE — 81003 URINALYSIS AUTO W/O SCOPE: CPT | Performed by: EMERGENCY MEDICINE

## 2019-01-23 RX ORDER — SODIUM CHLORIDE 0.9 % (FLUSH) 0.9 %
10 SYRINGE (ML) INJECTION AS NEEDED
Status: DISCONTINUED | OUTPATIENT
Start: 2019-01-23 | End: 2019-01-23 | Stop reason: HOSPADM

## 2019-01-23 RX ORDER — METRONIDAZOLE 500 MG/1
500 TABLET ORAL 3 TIMES DAILY
Qty: 21 TABLET | Refills: 0 | Status: SHIPPED | OUTPATIENT
Start: 2019-01-23

## 2019-01-23 RX ORDER — CIPROFLOXACIN 500 MG/1
500 TABLET, FILM COATED ORAL EVERY 12 HOURS
Qty: 14 TABLET | Refills: 0 | Status: SHIPPED | OUTPATIENT
Start: 2019-01-23

## 2019-01-23 RX ORDER — ONDANSETRON 4 MG/1
4 TABLET, ORALLY DISINTEGRATING ORAL ONCE
Status: COMPLETED | OUTPATIENT
Start: 2019-01-23 | End: 2019-01-23

## 2019-01-23 RX ORDER — HYDROCODONE BITARTRATE AND ACETAMINOPHEN 7.5; 325 MG/1; MG/1
1 TABLET ORAL EVERY 6 HOURS PRN
Qty: 12 TABLET | Refills: 0 | Status: SHIPPED | OUTPATIENT
Start: 2019-01-23

## 2019-01-23 RX ORDER — HYDROCODONE BITARTRATE AND ACETAMINOPHEN 7.5; 325 MG/1; MG/1
1 TABLET ORAL ONCE
Status: COMPLETED | OUTPATIENT
Start: 2019-01-23 | End: 2019-01-23

## 2019-01-23 RX ORDER — ONDANSETRON 4 MG/1
4 TABLET, FILM COATED ORAL EVERY 8 HOURS PRN
Qty: 20 TABLET | Refills: 0 | Status: SHIPPED | OUTPATIENT
Start: 2019-01-23

## 2019-01-23 RX ADMIN — ONDANSETRON 4 MG: 4 TABLET, ORALLY DISINTEGRATING ORAL at 15:55

## 2019-01-23 RX ADMIN — HYDROCODONE BITARTRATE AND ACETAMINOPHEN 1 TABLET: 7.5; 325 TABLET ORAL at 15:55

## 2019-01-23 NOTE — ED PROVIDER NOTES
Subjective   Geraldine Mehta is a 43 y.o.male who presents to the ED with complaints of abdominal pain. The patient reports onset of left sided abdominal pain and constipation yesterday. He feels like he has to have a bowel movement but can. Any stool he passes is fluid. He is passing less gas. He denies any blood in his stool, melena, fever, sweats, or nausea. He says this episode feels similar to when he had Diverticulitis in June of 2018. He was treated with IV abx followed by outpatient antibiotics. He has been eating foods with nuts and seeds. He recently picked up smoking again after being advised to quit. His surgical history is significant for an appendectomy. There are no other acute complaints at this time.        History provided by:  Patient  Abdominal Pain   Pain location: Left sided.  Pain radiates to:  Does not radiate  Pain severity:  Moderate  Onset quality:  Sudden  Duration:  1 day  Timing:  Constant  Progression:  Unchanged  Chronicity:  New  Context comment:  Feels similar to prior episode of diverticulitis.   Relieved by:  Nothing  Associated symptoms: constipation    Associated symptoms: no fever, no flatus, no hematochezia, no melena and no nausea    Risk factors: obesity        Review of Systems   Constitutional: Negative for diaphoresis and fever.   Gastrointestinal: Positive for abdominal pain and constipation. Negative for blood in stool, flatus, hematochezia, melena and nausea.   All other systems reviewed and are negative.      Past Medical History:   Diagnosis Date   • DJD (degenerative joint disease), lumbar    • Fatty liver    • Obesity    • Tobacco dependence    • Umbilical hernia        No Known Allergies    Past Surgical History:   Procedure Laterality Date   • APPENDECTOMY  2015       Family History   Problem Relation Age of Onset   • No Known Problems Mother    • No Known Problems Father        Social History     Socioeconomic History   • Marital status:      Spouse name:  Srinivasa   • Number of children: 5   • Years of education: H.S.   • Highest education level: Not on file   Occupational History   • Occupation:      Employer: [employer]   Tobacco Use   • Smoking status: Current Every Day Smoker     Packs/day: 0.50     Years: 24.00     Pack years: 12.00     Types: Cigarettes   • Smokeless tobacco: Never Used   Substance and Sexual Activity   • Alcohol use: No   • Drug use: No   • Sexual activity: Yes     Partners: Female         Objective   Physical Exam   Constitutional: He is oriented to person, place, and time. He appears well-developed and well-nourished. No distress.   HENT:   Head: Normocephalic and atraumatic.   Nose: Nose normal.   Eyes: Conjunctivae are normal. No scleral icterus.   Neck: Normal range of motion. Neck supple.   Cardiovascular: Normal rate, regular rhythm and normal heart sounds.   No murmur heard.  Pulmonary/Chest: Effort normal and breath sounds normal. No respiratory distress.   Abdominal: Soft. He exhibits no mass. There is tenderness (mildly) in the left lower quadrant. There is no guarding.   Hypoactive bowel sounds.    Musculoskeletal: Normal range of motion. He exhibits no edema.   Neurological: He is alert and oriented to person, place, and time.   Skin: Skin is warm and dry.   Psychiatric: He has a normal mood and affect. His behavior is normal.   Nursing note and vitals reviewed.      Procedures         ED Course  ED Course as of Jan 23 2048   Wed Jan 23, 2019   1824 Reevaluated the patient at bedside and updated him on findings and plan for further care.   [TJ]      ED Course User Index  [TJ] Maco Torrez     Recent Results (from the past 24 hour(s))   Comprehensive Metabolic Panel    Collection Time: 01/23/19  2:40 PM   Result Value Ref Range    Glucose 109 (H) 70 - 100 mg/dL    BUN 10 9 - 23 mg/dL    Creatinine 0.78 0.60 - 1.30 mg/dL    Sodium 134 132 - 146 mmol/L    Potassium 3.6 3.5 - 5.5 mmol/L    Chloride 104 99 - 109  mmol/L    CO2 21.0 20.0 - 31.0 mmol/L    Calcium 9.6 8.7 - 10.4 mg/dL    Total Protein 8.0 5.7 - 8.2 g/dL    Albumin 4.76 3.20 - 4.80 g/dL    ALT (SGPT) 32 7 - 40 U/L    AST (SGOT) 21 0 - 33 U/L    Alkaline Phosphatase 108 (H) 25 - 100 U/L    Total Bilirubin 1.1 0.3 - 1.2 mg/dL    eGFR Non African Amer 108 >60 mL/min/1.73    Globulin 3.2 gm/dL    A/G Ratio 1.5 1.5 - 2.5 g/dL    BUN/Creatinine Ratio 12.8 7.0 - 25.0    Anion Gap 9.0 3.0 - 11.0 mmol/L   Lipase    Collection Time: 01/23/19  2:40 PM   Result Value Ref Range    Lipase 35 6 - 51 U/L   Light Blue Top    Collection Time: 01/23/19  2:40 PM   Result Value Ref Range    Extra Tube hold for add-on    Green Top (Gel)    Collection Time: 01/23/19  2:40 PM   Result Value Ref Range    Extra Tube Hold for add-ons.    Lavender Top    Collection Time: 01/23/19  2:40 PM   Result Value Ref Range    Extra Tube hold for add-on    Gold Top - SST    Collection Time: 01/23/19  2:40 PM   Result Value Ref Range    Extra Tube Hold for add-ons.    CBC Auto Differential    Collection Time: 01/23/19  2:40 PM   Result Value Ref Range    WBC 11.94 (H) 3.50 - 10.80 10*3/mm3    RBC 5.28 4.20 - 5.76 10*6/mm3    Hemoglobin 16.9 13.1 - 17.5 g/dL    Hematocrit 49.5 38.9 - 50.9 %    MCV 93.8 80.0 - 99.0 fL    MCH 32.0 (H) 27.0 - 31.0 pg    MCHC 34.1 32.0 - 36.0 g/dL    RDW 13.3 11.3 - 14.5 %    RDW-SD 45.6 37.0 - 54.0 fl    MPV 12.4 (H) 6.0 - 12.0 fL    Platelets 178 150 - 450 10*3/mm3    Neutrophil % 60.6 41.0 - 71.0 %    Lymphocyte % 27.0 24.0 - 44.0 %    Monocyte % 10.2 0.0 - 12.0 %    Eosinophil % 1.8 0.0 - 3.0 %    Basophil % 0.4 0.0 - 1.0 %    Immature Grans % 0.1 0.0 - 0.6 %    Neutrophils, Absolute 7.24 1.50 - 8.30 10*3/mm3    Lymphocytes, Absolute 3.22 0.60 - 4.80 10*3/mm3    Monocytes, Absolute 1.22 (H) 0.00 - 1.00 10*3/mm3    Eosinophils, Absolute 0.21 0.00 - 0.30 10*3/mm3    Basophils, Absolute 0.05 0.00 - 0.20 10*3/mm3    Immature Grans, Absolute 0.01 0.00 - 0.03 10*3/mm3    Urinalysis With Microscopic If Indicated (No Culture) - Urine, Clean Catch    Collection Time: 01/23/19  5:28 PM   Result Value Ref Range    Color, UA Yellow Yellow, Straw    Appearance, UA Clear Clear    pH, UA 6.0 5.0 - 8.0    Specific Gravity, UA 1.022 1.001 - 1.030    Glucose, UA Negative Negative    Ketones, UA Trace (A) Negative    Bilirubin, UA Negative Negative    Blood, UA Negative Negative    Protein, UA Negative Negative    Leuk Esterase, UA Negative Negative    Nitrite, UA Negative Negative    Urobilinogen, UA 0.2 E.U./dL 0.2 - 1.0 E.U./dL     Note: In addition to lab results from this visit, the labs listed above may include labs taken at another facility or during a different encounter within the last 24 hours. Please correlate lab times with ED admission and discharge times for further clarification of the services performed during this visit.    CT Abdomen Pelvis Without Contrast   ED Interpretation   Acute uncomplicated sigmoid diverticulitis.       D:  01/23/2019   E:  01/23/2019       This report was finalized on 1/23/2019 4:47 PM by Claudio Linda.          Final Result   Acute uncomplicated sigmoid diverticulitis.       D:  01/23/2019   E:  01/23/2019       This report was finalized on 1/23/2019 4:47 PM by Claudio Linda.            Vitals:    01/23/19 1436 01/23/19 1516 01/23/19 1517 01/23/19 1800   BP:  106/81  112/70   BP Location:       Patient Position:       Pulse: 78  71 64   Resp:       Temp:       TempSrc:       SpO2:  97% 97% 94%   Weight:       Height:         Medications   sodium chloride 0.9 % flush 10 mL (not administered)   ondansetron ODT (ZOFRAN-ODT) disintegrating tablet 4 mg (4 mg Oral Given 1/23/19 9545)   HYDROcodone-acetaminophen (NORCO) 7.5-325 MG per tablet 1 tablet (1 tablet Oral Given 1/23/19 1555)     ECG/EMG Results (last 24 hours)     ** No results found for the last 24 hours. **        No orders to display                       MDM    Final diagnoses:   Sigmoid  diverticulitis       Documentation assistance provided by leif Torrez.  Information recorded by the silva[facility]red was done at my direction and has been verified and validated by me.     Maco Torrez  01/23/19 7430       Adair Byrd PA-C  01/23/19 9691

## 2019-01-23 NOTE — DISCHARGE INSTRUCTIONS
Low fiber diet, increase fluids.  No dairy or fried foods.  Follow-up with Dr. Willett, call tomorrow to schedule your earliest available follow-up.  Return to emergency department immediately if any change or worsening of symptoms.

## 2021-06-24 NOTE — PLAN OF CARE
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 RADIATION DOSE: Air Kerma: 621 mGy. COMPLICATIONS: No immediate complications. STERILE BARRIER TECHNIQUE: Maximum sterile barrier technique was used. Cutaneous antisepsis was performed at the operative site with application of 2% chlorhexidine and large sterile drape. Prior to the procedure, the  and assistant performed hand hygiene and wore hat, mask, sterile gown, and sterile gloves during the entire procedure. PROCEDURE/FINDINGS:  The patient's right neck was sterilely prepped and draped. After giving local lidocaine anesthesia, a 21 gauge needle was used to puncture the right internal jugular vein from a middle transverse approach under ultrasound guidance with an image stored for the record. A 0.018 inch wire was advanced through the needle into the central veins, as confirmed fluoroscopically. The needle was then exchanged over the wire for a 5 Burkinan transitional coaxial dilator. The 0.018 inch wire and  inner 3 Burkinan dilator were then exchanged for a 0.035 inch wire, which was advanced into the inferior vena cava under fluoroscopic guidance. The dilator was then exchanged over the wire for a 5 Burkinan vascular sheath. A 5 Burkinan Omni Flush catheter was  manipulated into the IVC, and directed below the level of the IVC filter. Digital subtraction inferior venacavogram was then performed covering the mid abdomen demonstrating no active extravasation however the confluence of the right gonadal vein and IVC was identified. The Omni Flush catheter was exchanged over the Amplatz wire for a 5 Burkinan multipurpose catheter. The multipurpose catheter was used to select the right gonadal vein. Digital subtraction angiography was performed confirming its proper position. With the aid of a 0.035 inch angled Glidewire the multipurpose catheter was directed into the mid aspect of the right gonadal vein. Digital subtraction  venogram was performed which demonstrated a myriad of small veins entering the retroperitoneum. Note was also made of active extravasation of contrast which may be related to injection of contrast into the terminal vein. Embolization was performed throughout this right gonadal vein using multiple embolization coils (three 3/5 tornado coils, four 3 mm x 7 cm Agustin coils, three 3 mm x 14 cm Agustin coils, and one 4 mm x 3 cm Agustin coil), both without and with the aid of a  2.7 Khmer Progreat microcatheter. The catheter was retracted and repeat angiography was performed which demonstrated satisfactory embolization of the right gonadal vein. The catheter balloon is then retracted into the inferior vena cava and repeat venography was performed. This demonstrated satisfactory embolization of the right gonadal vein without evidence of contrast reflux into the gonadal vein itself. The catheter and sheath were removed and hemostasis was achieved with manual compression.     1. Successful embolization of right gonadal vein. These findings were discussed with Dr. Wall immediately following the procedure. CPT code: 14885 [id number] x 3    Ir Ivc Filter Placement    Result Date: 2/15/2019  [facility] RADIOLOGY LOCATION: [facility] DATE: 2/14/2019 PROCEDURE: INFERIOR VENA CAVOGRAM AND INFERIOR VENA CAVA FILTER PLACEMENT 1.  Ultrasound-guided access of the right internal jugular vein. A permanent image was stored. 2.  Digital subtraction inferior cavography. 3.  Inferior vena cava filter placement. 4.  Moderate sedation. INTERVENTIONAL RADIOLOGIST: Yrn Arciniega MD INDICATION: Venous thromboembolism with contraindication to anticoagulation. Plan for retrievable inferior vena cava filter placement. CONSENT: The risks, benefits and alternatives of retrievable inferior vena cava filter placement were discussed with the patient  in detail. All questions were answered. Informed consent was  given to proceed with the procedure. MODERATE SEDATION: Versed 0 mg IV; Fentanyl 25 mcg IV. During the time out, immediately prior to the administration of medications, the patient was reassessed for adequacy to receive conscious sedation.  Under physician supervision, Versed and fentanyl were administered for moderate sedation. Pulse oximetry, heart rate and blood pressure were continuously monitored by an independent trained observer. The physician spent 15 minutes of face-to-face sedation time with the patient. CONTRAST: 50 mL Omnipaque 350 ANTIBIOTICS: None. ADDITIONAL MEDICATIONS: None. FLUOROSCOPIC TIME: 3.2 minutes. RADIATION DOSE: Air Kerma: 127 mGy. COMPLICATIONS: No immediate complications. STERILE BARRIER TECHNIQUE: Maximum sterile barrier technique was used. Cutaneous antisepsis was performed at the operative site with application of 2% chlorhexidine and large sterile drape. Prior to the procedure, the  and assistant performed hand hygiene and wore hat, mask, sterile gown, and sterile gloves during the entire procedure. PROCEDURE:  The right internal jugular vein was punctured under real-time ultrasound guidance. A 5 Welsh pigtail catheter was positioned in the IVC. A diagnostic inferior vena cavagram was obtained to evaluate for caval thrombus, renal vein anatomy/anomalies and location, caval anatomy/anomalies and diameter. Following the diagnostic study, a Beaufort retrievable inferior vena caval filter was deployed just below the lowest renal vein. Post placement fluoroscopy demonstrated appropriate position. The sheath was then removed and compression applied to the puncture site until hemostasis was achieved. FINDINGS: Ultrasound shows an anechoic and compressible jugular vein. The diagnostic inferior vena cavagram shows a normal-caliber vena cava without anatomic anomaly or thrombus.     1. Successful placement of an inferior vena cava filter, as discussed above. PLAN: A retrievable filter  was placed. Please contact the department of Interventional Radiology ([phone]) once the filter is no longer medically necessary to have the filter removed. CPT codes for physician reference only: 37105 [id number]    Xr Retrograde Pyelogram W Or Wo Kub Intraoperative    Result Date: 2/16/2019  XR RETROGRADE PYELOGRAM W OR WO KUB INTRAOPERATIVE 2/16/2019 12:03 PM INDICATION: Hydronephrosis. Ureteral stent exchange. TECHNIQUE: Exam performed by Urologist. COMPARISON: CT 06/15/2019 FLUOROSCOPIC TIME: .6 minutes NUMBER OF IMAGES: 1 FINDINGS: The proximal pigtail of the ureteral stent now projects over the renal pelvis. Contrast in mildly distended calyces and renal pelvis. Elliptical puddle of contrast adjacent the proximal ureteral stent. Caval filter.    Ct Abdomen Pelvis Without Oral With Iv Contrast    Addendum Date: 2/22/2019    ADDENDUM: The suspected active bleeding vein of the right retroperitoneum may be the right gonadal vein. The images were reviewed with Dr. Casillas. END ADDENDUM    Result Date: 2/22/2019  [facility] RADIOLOGY EXAM: CT ABDOMEN PELVIS WO ORAL W IV CONTRAST LOCATION: [facility] DATE/TIME: 2/22/2019 5:03 PM INDICATION: Injury abdominal organs decrease bp, increse laura output COMPARISON: 02/15/2019. TECHNIQUE: Helical enhanced thin-section CT scan of the abdomen and pelvis was performed following injection of IV contrast. Multiplanar reformats were obtained. Dose reduction techniques were used. CONTRAST: Iohexol (Omni) 75mL FINDINGS: LUNG BASES: Negative. ABDOMEN: Surgical changes of right nephroureterectomy including rather extensive soft tissue emphysema of the included lower thoracic, abdominal, and pelvic wall. Moderate volume of free fluid throughout the abdomen and pelvis. A portion of the fluid has  relatively low density although there are components layering dependently that have higher density and consistent with the presence of blood products. Discrete high  density collections in the right retroperitoneum lateral to the psoas muscle in the abdomen and near the right iliac bifurcation in the pelvis consistent with hematoma measure about 5.1 x 3.1 x 5.6 cm and 4.7 x 2.3 x 5.0 cm respectively (images 40-48 and 57-64 of axial series 2). Notably, the collection lateral to the right psoas has horizontally layering hyperdense material within it consistent with active hemorrhage (image 45 of axial series 2). Additionally, there is a small amount of ill-defined hyperdense material along the anterior margin of the nearby right psoas muscle also consistent with active hemorrhage (images 40-48 of axial series 2, 59-64 of coronal series 400.2). This material is in proximity to a small vein extending from the inferior nephrectomy bed to the IVC, probably a lower pole accessory renal vein, suggesting it is the source of hemorrhage (e.g. coronal image 61-62 of series 400.2). A surgical percutaneous drain enters via the midline ventral pelvic wall and passes through the right retroperitoneum terminating inferior to the right hepatic lobe. Inferior vena cava filter is present with superior apex near the confluence of the left renal vein with IVC. Patient is post cholecystectomy. A few tiny probable hepatic cysts. Spleen, pancreas, adrenal glands, and left kidney are negative. PELVIS: Patient is post subtotal colectomy, appendectomy, and hysterectomy. MUSCULOSKELETAL: Negative.     CONCLUSION: 1.  Surgical changes of right nephroureterostomy. 2.  Evidence for active bleeding likely arising from a small right accessory renal vein. 3.  Moderate volume of hemorrhagic ascites in the abdomen and pelvis. More discrete hematoma formation in the right retroperitoneum. I discussed the findings with Dr. Casillas of Urology at 5:20 PM on 02/22/2019.     Ct Abdomen Pelvis Without Oral With Iv Contrast    Result Date: 2/15/2019  [facility] RADIOLOGY EXAM: CT ABDOMEN PELVIS WO ORAL W IV CONTRAST LOCATION:  [facility] DATE/TIME: 2/15/2019 5:43 PM INDICATION: Abd pain, fever, abscess suspected fevers COMPARISON: 02/07/2019 CT TECHNIQUE: Helical enhanced thin-section CT scan of the abdomen and pelvis was performed following injection of IV contrast. Multiplanar reformats were obtained. Dose reduction techniques were used. CONTRAST: Iohexol (Omni) 75mL FINDINGS: LUNG BASES: Negative. ABDOMEN: New right nephromegaly hydronephrosis, and abnormal renal enhancement compatible with hilar nephritis. There is a ureteral stent, however the proximal pigtail catheter is in the proximal ureter, below the UPJ. Tiny right renal nonobstructive stone. Normal-appearing left kidney, adrenals, spleen, and pancreas. Cholecystectomy. Stable intrahepatic bile duct prominence. Stable small hepatic cysts. Caval filter. Circumaortic left renal vein. PELVIS: Right ureteral stent pigtail in bladder. Monae catheter. Small bowel appears normal. Absent left colon. Surgical staple line right colon. No adenopathy. MUSCULOSKELETAL: Spinal degenerative change.     CONCLUSION: 1.  New right hydronephrosis and pyelonephritis. 2.  Proximal ureteral pigtail malpositioned in the proximal ureter. 3.  Nonobstructive right renal stone.     Ct Abdomen Pelvis Without Oral With Without Iv Contrast    Result Date: 2/7/2019  [facility] RADIOLOGY EXAM: CT ABDOMEN PELVIS WO ORAL W WO IV CONTRAST LOCATION: [facility] DATE/TIME: 2/7/2019 7:42 PM INDICATION: Hematuria. COMPARISON: 01/29/2019. TECHNIQUE: Helical thin-section CT scan of the abdomen and pelvis was performed without contrast. Images were then obtained during and after injection of IV contrast, with delayed images through the renal collecting systems. Multiplanar reformats were obtained. Dose reduction techniques were used.? CONTRAST: Iohexol (Omni) 100 mL. FINDINGS: LUNG BASES: Trace right pleural effusion. Minimal basilar atelectasis/scarring. ABDOMEN: Stable 2 mm nonobstructing right renal  interpolar and 1 mm left renal upper pole nonobstructing calculi. No ureteral calculi. Stable 3 mm amorphous left renal mid to upper pole hyperdensity (series 2, image 34). Tiny renal hypodensities may represent cysts, too small to characterize. No ureteral filling defect. Minimal accumulation of contrast in the bladder on delayed imaging without definitive wall thickening. Multiple hepatic hypodensities up to 10 mm. Larger ones are suggestive of cysts, though others too small to accurately characterize. Cholecystectomy. Unremarkable pancreas, spleen and adrenals. Severely atherosclerotic aorta. Incidental circumaortic left  renal vein. No free air or adenopathy. PELVIS: Monae catheter in urinary bladder. Surgical changes bowel. Subtotal colectomy. Small and large bowel fluid. Minimal free fluid. MUSCULOSKELETAL: Negative.     CONCLUSION: 1.  Stable tiny nonobstructing renal calculi. No ureteral or bladder calculi. No hydronephrosis. 2.  Tiny renal hypodensities, too small to accurately characterize. This may represent tiny cysts. 3.  No ureteral or definitive bladder filling defect; though incomplete distention of the bladder on delayed imaging with Monae catheter in place. 4.  Air-fluid levels in bowel; correlate for enteritis. No obstruction. 5.  Other findings in the report.         CHRISTINA Mercado